# Patient Record
Sex: FEMALE | Race: WHITE | NOT HISPANIC OR LATINO | Employment: UNEMPLOYED | URBAN - METROPOLITAN AREA
[De-identification: names, ages, dates, MRNs, and addresses within clinical notes are randomized per-mention and may not be internally consistent; named-entity substitution may affect disease eponyms.]

---

## 2017-02-15 ENCOUNTER — HOSPITAL ENCOUNTER (OUTPATIENT)
Facility: MEDICAL CENTER | Age: 62
End: 2017-02-17
Attending: EMERGENCY MEDICINE | Admitting: INTERNAL MEDICINE
Payer: MEDICAID

## 2017-02-15 ENCOUNTER — APPOINTMENT (OUTPATIENT)
Dept: RADIOLOGY | Facility: MEDICAL CENTER | Age: 62
End: 2017-02-15
Attending: EMERGENCY MEDICINE
Payer: MEDICAID

## 2017-02-15 DIAGNOSIS — R94.31 T WAVE INVERSION IN EKG: ICD-10-CM

## 2017-02-15 DIAGNOSIS — R42 DIZZINESS: ICD-10-CM

## 2017-02-15 PROCEDURE — 99285 EMERGENCY DEPT VISIT HI MDM: CPT

## 2017-02-15 PROCEDURE — 36415 COLL VENOUS BLD VENIPUNCTURE: CPT

## 2017-02-15 PROCEDURE — 85610 PROTHROMBIN TIME: CPT

## 2017-02-15 PROCEDURE — 85025 COMPLETE CBC W/AUTO DIFF WBC: CPT

## 2017-02-15 PROCEDURE — 80053 COMPREHEN METABOLIC PANEL: CPT

## 2017-02-15 PROCEDURE — 83690 ASSAY OF LIPASE: CPT

## 2017-02-15 PROCEDURE — 85730 THROMBOPLASTIN TIME PARTIAL: CPT

## 2017-02-15 PROCEDURE — 84484 ASSAY OF TROPONIN QUANT: CPT

## 2017-02-15 PROCEDURE — 93005 ELECTROCARDIOGRAM TRACING: CPT | Performed by: EMERGENCY MEDICINE

## 2017-02-15 PROCEDURE — 94760 N-INVAS EAR/PLS OXIMETRY 1: CPT

## 2017-02-15 PROCEDURE — 83880 ASSAY OF NATRIURETIC PEPTIDE: CPT

## 2017-02-15 RX ORDER — HYDRALAZINE HYDROCHLORIDE 20 MG/ML
10 INJECTION INTRAMUSCULAR; INTRAVENOUS ONCE
Status: COMPLETED | OUTPATIENT
Start: 2017-02-16 | End: 2017-02-16

## 2017-02-15 ASSESSMENT — ENCOUNTER SYMPTOMS
DIZZINESS: 1
CHILLS: 0
VOMITING: 0
NAUSEA: 0
SHORTNESS OF BREATH: 0
HEADACHES: 1
DIARRHEA: 0

## 2017-02-15 ASSESSMENT — PAIN SCALES - GENERAL: PAINLEVEL_OUTOF10: 4

## 2017-02-15 ASSESSMENT — LIFESTYLE VARIABLES: DO YOU DRINK ALCOHOL: NO

## 2017-02-15 NOTE — IP AVS SNAPSHOT
" Home Care Instructions                                                                                                                  Name:Lesia Freeman  Medical Record Number:[id number]  CSN: [id number]    YOB: 1955   Age: 61 y.o.  Sex: female  HT:1.499 m (4' 11.02\") WT: 65.6 kg (144 lb 10 oz)          Admit Date: 2/15/2017     Discharge Date:   Today's Date: 2/17/2017  Attending Doctor:  Brady Myers M.D.                  Allergies:  Review of patient's allergies indicates not on file.            Discharge Instructions       Discharge Instructions    Discharged to home by taxi with escort. Discharged via wheelchair, hospital escort: Yes.  Special equipment needed: Not Applicable    Be sure to schedule a follow-up appointment with your primary care doctor or any specialists as instructed.     Discharge Plan:   Diet Plan: Discussed  Activity Level: Discussed  Smoking Cessation Offered: Patient Refused  Confirmed Follow up Appointment: Appointment Scheduled  Confirmed Symptoms Management: Discussed  Medication Reconciliation Updated: Yes  Influenza Vaccine Indication: Not indicated: Previously immunized this influenza season and > 8 years of age    I understand that a diet low in cholesterol, fat, and sodium is recommended for good health. Unless I have been given specific instructions below for another diet, I accept this instruction as my diet prescription.   Other diet: low fat    Special Instructions: None    · Is patient discharged on Warfarin / Coumadin?   No     · Is patient Post Blood Transfusion?  No    Depression / Suicide Risk    As you are discharged from this [facility] facility, it is important to learn how to keep safe from harming yourself.    Recognize the warning signs:  · Abrupt changes in personality, positive or negative- including increase in energy   · Giving away possessions  · Change in eating patterns- significant weight changes-  positive or negative  · Change in sleeping " patterns- unable to sleep or sleeping all the time   · Unwillingness or inability to communicate  · Depression  · Unusual sadness, discouragement and loneliness  · Talk of wanting to die  · Neglect of personal appearance   · Rebelliousness- reckless behavior  · Withdrawal from people/activities they love  · Confusion- inability to concentrate     If you or a loved one observes any of these behaviors or has concerns about self-harm, here's what you can do:  · Talk about it- your feelings and reasons for harming yourself  · Remove any means that you might use to hurt yourself (examples: pills, rope, extension cords, firearm)  · Get professional help from the community (Mental Health, Substance Abuse, psychological counseling)  · Do not be alone:Call your Safe Contact- someone whom you trust who will be there for you.  · Call your local CRISIS HOTLINE 363-0869 or 979-575-2028  · Call your local Children's Mobile Crisis Response Team [place] (755) 132-1523 or www.[employer]  · Call the toll free National Suicide Prevention Hotlines   · National Suicide Prevention Lifeline 029-569-DLTX (8866)  · National Hope Line Network 800-SUICIDE (394-3736)           Discharge Medication Instructions:    Below are the medications your physician expects you to take upon discharge:    Review all your home medications and newly ordered medications with your doctor and/or pharmacist. Follow medication instructions as directed by your doctor and/or pharmacist.    Please keep your medication list with you and share with your physician.               Medication List      START taking these medications        Instructions    amlodipine 10 MG Tabs   Last time this was given:  10 mg on 2/17/2017  8:00 AM   Commonly known as:  NORVASC    Take 1 Tab by mouth every day.   Dose:  10 mg       metformin 500 MG Tabs   Commonly known as:  GLUCOPHAGE    Take 1 Tab by mouth 2 times a day, with meals.   Dose:  500 mg         CONTINUE taking  these medications        Instructions    ATORVASTATIN CALCIUM PO   Last time this was given:  10 mg on 2/16/2017  8:11 PM    Take 80 mg by mouth every day.   Dose:  80 mg       CYTOMEL PO    Take 5 mcg by mouth every day.   Dose:  5 mcg       FIORICET PO    Take  by mouth every 6 hours as needed.       FLEXERIL PO    Take  by mouth.       ibuprofen 800 MG Tabs   Commonly known as:  MOTRIN    Take 800 mg by mouth 3 times a day.   Dose:  800 mg       IMITREX PO   Last time this was given:  50 mg on 2/16/2017  5:45 PM    Take  by mouth.       KLONOPIN PO   Last time this was given:  1 mg on 2/16/2017 12:10 PM    Take 1 mg by mouth 1 time daily as needed (anxiety).   Dose:  1 mg       * LEVOTHYROXINE SODIUM PO   Last time this was given:  100 mcg on 2/17/2017  6:00 AM    Take  by mouth.       * levothyroxine 100 MCG Tabs   Last time this was given:  100 mcg on 2/17/2017  6:00 AM   Commonly known as:  SYNTHROID    Take 100 mcg by mouth Every morning on an empty stomach. Indications: Underactive Thyroid   Dose:  100 mcg       LISINOPRIL PO   Last time this was given:  40 mg on 2/17/2017  6:05 AM    Take 40 mg by mouth every day.   Dose:  40 mg       METOPROLOL TARTRATE PO   Last time this was given:  50 mg on 2/17/2017  8:01 AM    Take 25 mg by mouth every day.   Dose:  25 mg       NORCO PO    Take  by mouth 3 times a day.       pregabalin 75 MG Caps   Last time this was given:  100 mg on 2/17/2017  9:00 AM   Commonly known as:  LYRICA    Take 100 mg by mouth Once. Indications: Generalized Anxiety Disorder   Dose:  100 mg       TRAZODONE HCL PO    Take 100 mg by mouth every day.   Dose:  100 mg       VIIBRYD PO   Last time this was given:  20 mg on 2/17/2017  9:00 AM    Take 20 mg by mouth every day.   Dose:  20 mg       * Notice:  This list has 2 medication(s) that are the same as other medications prescribed for you. Read the directions carefully, and ask your doctor or other care provider to review them with you.             Instructions           Diet / Nutrition:    Follow any diet instructions given to you by your doctor or the dietician, including how much salt (sodium) you are allowed each day.    If you are overweight, talk to your doctor about a weight reduction plan.    Activity:    Remain physically active following your doctor's instructions about exercise and activity.    Rest often.     Any time you become even a little tired or short of breath, SIT DOWN and rest.    Worsening Symptoms:    Report any of the following signs and symptoms to the doctor's office immediately:    *Pain of jaw, arm, or neck  *Chest pain not relieved by medication                               *Dizziness or loss of consciousness  *Difficulty breathing even when at rest   *More tired than usual                                       *Bleeding drainage or swelling of surgical site  *Swelling of feet, ankles, legs or stomach                 *Fever (>100ºF)  *Pink or blood tinged sputum  *Weight gain (3lbs/day or 5lbs /week)           *Shock from internal defibrillator (if applicable)  *Palpitations or irregular heartbeats                *Cool and/or numb extremities    Stroke Awareness    Common Risk Factors for Stroke include:    Age  Atrial Fibrillation  Carotid Artery Stenosis  Diabetes Mellitus  Excessive alcohol consumption  High blood pressure  Overweight   Physical inactivity  Smoking    Warning signs and symptoms of a stroke include:    *Sudden numbness or weakness of the face, arm or leg (especially on one side of the body).  *Sudden confusion, trouble speaking or understanding.  *Sudden trouble seeing in one or both eyes.  *Sudden trouble walking, dizziness, loss of balance or coordination.Sudden severe headache with no known cause.    It is very important to get treatment quickly when a stroke occurs. If you experience any of the above warning signs, call 911 immediately.                   Disclaimer         Quit Smoking / Tobacco  Use:    I understand the use of any tobacco products increases my chance of suffering from future heart disease or stroke and could cause other illnesses which may shorten my life. Quitting the use of tobacco products is the single most important thing I can do to improve my health. For further information on smoking / tobacco cessation call a Toll Free Quit Line at 1-790.520.4226 (*National Cancer [place]) or 1-846.154.3574 (American Lung Association) or you can access the web based program at www.lungusa.org.    [place] Tobacco Users Help Line:  (745) 788-2723       Toll Free: 1-712.880.9708  Quit Tobacco Program [facility] ([phone]    Crisis Hotline:    [place] Crisis Hotline:  5-137-MAAURLU or 1-787.814.4961    [place] Crisis Hotline:    1-304.680.2796 or 335-659-6491    Discharge Survey:   Thank you for choosing [facility]. We hope we did everything we could to make your hospital stay a pleasant one. You may be receiving a phone survey and we would appreciate your time and participation in answering the questions. Your input is very valuable to us in our efforts to improve our service to our patients and their families.        My signature on this form indicates that:    1. I have reviewed and understand the above information.  2. My questions regarding this information have been answered to my satisfaction.  3. I have formulated a plan with my discharge nurse to obtain my prescribed medications for home.                  Disclaimer         __________________________________                     __________       ________                       Patient Signature                                                 Date                    Time

## 2017-02-16 ENCOUNTER — RESOLUTE PROFESSIONAL BILLING HOSPITAL PROF FEE (OUTPATIENT)
Dept: HOSPITALIST | Facility: MEDICAL CENTER | Age: 62
End: 2017-02-16
Payer: MEDICAID

## 2017-02-16 PROBLEM — R42 DIZZINESS: Status: ACTIVE | Noted: 2017-02-16

## 2017-02-16 PROBLEM — R51.9 INTRACTABLE HEADACHE: Status: ACTIVE | Noted: 2017-02-16

## 2017-02-16 PROBLEM — I16.0 HYPERTENSIVE URGENCY: Status: ACTIVE | Noted: 2017-02-16

## 2017-02-16 LAB
ALBUMIN SERPL BCP-MCNC: 3.8 G/DL (ref 3.2–4.9)
ALBUMIN/GLOB SERPL: 1.3 G/DL
ALP SERPL-CCNC: 128 U/L (ref 30–99)
ALT SERPL-CCNC: 118 U/L (ref 2–50)
ANION GAP SERPL CALC-SCNC: 7 MMOL/L (ref 0–11.9)
APPEARANCE UR: CLEAR
APTT PPP: 25.5 SEC (ref 24.7–36)
AST SERPL-CCNC: 32 U/L (ref 12–45)
BACTERIA #/AREA URNS HPF: ABNORMAL /HPF
BASOPHILS # BLD AUTO: 0.5 % (ref 0–1.8)
BASOPHILS # BLD: 0.05 K/UL (ref 0–0.12)
BILIRUB SERPL-MCNC: 0.3 MG/DL (ref 0.1–1.5)
BILIRUB UR QL STRIP.AUTO: NEGATIVE
BNP SERPL-MCNC: 45 PG/ML (ref 0–100)
BUN SERPL-MCNC: 9 MG/DL (ref 8–22)
CALCIUM SERPL-MCNC: 9.5 MG/DL (ref 8.5–10.5)
CHLORIDE SERPL-SCNC: 105 MMOL/L (ref 96–112)
CHOLEST SERPL-MCNC: 93 MG/DL (ref 100–199)
CO2 SERPL-SCNC: 28 MMOL/L (ref 20–33)
COLOR UR: ABNORMAL
CREAT SERPL-MCNC: 0.67 MG/DL (ref 0.5–1.4)
CULTURE IF INDICATED INDCX: YES UA CULTURE
EKG IMPRESSION: NORMAL
EOSINOPHIL # BLD AUTO: 0.27 K/UL (ref 0–0.51)
EOSINOPHIL NFR BLD: 2.7 % (ref 0–6.9)
EPI CELLS #/AREA URNS HPF: ABNORMAL /HPF
ERYTHROCYTE [DISTWIDTH] IN BLOOD BY AUTOMATED COUNT: 42 FL (ref 35.9–50)
EST. AVERAGE GLUCOSE BLD GHB EST-MCNC: 174 MG/DL
GFR SERPL CREATININE-BSD FRML MDRD: >60 ML/MIN/1.73 M 2
GLOBULIN SER CALC-MCNC: 2.9 G/DL (ref 1.9–3.5)
GLUCOSE BLD-MCNC: 145 MG/DL (ref 65–99)
GLUCOSE BLD-MCNC: 194 MG/DL (ref 65–99)
GLUCOSE BLD-MCNC: 197 MG/DL (ref 65–99)
GLUCOSE BLD-MCNC: 245 MG/DL (ref 65–99)
GLUCOSE SERPL-MCNC: 280 MG/DL (ref 65–99)
GLUCOSE UR STRIP.AUTO-MCNC: 500 MG/DL
HBA1C MFR BLD: 7.7 % (ref 0–5.6)
HCT VFR BLD AUTO: 43.5 % (ref 37–47)
HDLC SERPL-MCNC: 36 MG/DL
HGB BLD-MCNC: 14.7 G/DL (ref 12–16)
HYALINE CASTS #/AREA URNS LPF: ABNORMAL /LPF
IMM GRANULOCYTES # BLD AUTO: 0.04 K/UL (ref 0–0.11)
IMM GRANULOCYTES NFR BLD AUTO: 0.4 % (ref 0–0.9)
INR PPP: 1.02 (ref 0.87–1.13)
KETONES UR STRIP.AUTO-MCNC: NEGATIVE MG/DL
LDLC SERPL CALC-MCNC: 39 MG/DL
LEUKOCYTE ESTERASE UR QL STRIP.AUTO: ABNORMAL
LIPASE SERPL-CCNC: 30 U/L (ref 11–82)
LYMPHOCYTES # BLD AUTO: 4.28 K/UL (ref 1–4.8)
LYMPHOCYTES NFR BLD: 42.2 % (ref 22–41)
MCH RBC QN AUTO: 31.1 PG (ref 27–33)
MCHC RBC AUTO-ENTMCNC: 33.8 G/DL (ref 33.6–35)
MCV RBC AUTO: 92 FL (ref 81.4–97.8)
MICRO URNS: ABNORMAL
MONOCYTES # BLD AUTO: 0.56 K/UL (ref 0–0.85)
MONOCYTES NFR BLD AUTO: 5.5 % (ref 0–13.4)
NEUTROPHILS # BLD AUTO: 4.95 K/UL (ref 2–7.15)
NEUTROPHILS NFR BLD: 48.7 % (ref 44–72)
NITRITE UR QL STRIP.AUTO: NEGATIVE
NRBC # BLD AUTO: 0 K/UL
NRBC BLD AUTO-RTO: 0 /100 WBC
PH UR STRIP.AUTO: 7 [PH]
PLATELET # BLD AUTO: 220 K/UL (ref 164–446)
PMV BLD AUTO: 11.2 FL (ref 9–12.9)
POTASSIUM SERPL-SCNC: 3.5 MMOL/L (ref 3.6–5.5)
PROT SERPL-MCNC: 6.7 G/DL (ref 6–8.2)
PROT UR QL STRIP: NEGATIVE MG/DL
PROTHROMBIN TIME: 13.7 SEC (ref 12–14.6)
RBC # BLD AUTO: 4.73 M/UL (ref 4.2–5.4)
RBC # URNS HPF: ABNORMAL /HPF
RBC UR QL AUTO: NEGATIVE
SODIUM SERPL-SCNC: 140 MMOL/L (ref 135–145)
SP GR UR STRIP.AUTO: 1.01
TRIGL SERPL-MCNC: 91 MG/DL (ref 0–149)
TROPONIN I SERPL-MCNC: <0.01 NG/ML (ref 0–0.04)
TSH SERPL DL<=0.005 MIU/L-ACNC: 4.73 UIU/ML (ref 0.3–3.7)
WBC # BLD AUTO: 10.2 K/UL (ref 4.8–10.8)
WBC #/AREA URNS HPF: ABNORMAL /HPF

## 2017-02-16 PROCEDURE — 87086 URINE CULTURE/COLONY COUNT: CPT

## 2017-02-16 PROCEDURE — 96375 TX/PRO/DX INJ NEW DRUG ADDON: CPT

## 2017-02-16 PROCEDURE — A9270 NON-COVERED ITEM OR SERVICE: HCPCS | Performed by: INTERNAL MEDICINE

## 2017-02-16 PROCEDURE — 700102 HCHG RX REV CODE 250 W/ 637 OVERRIDE(OP): Performed by: NURSE PRACTITIONER

## 2017-02-16 PROCEDURE — 700102 HCHG RX REV CODE 250 W/ 637 OVERRIDE(OP): Performed by: INTERNAL MEDICINE

## 2017-02-16 PROCEDURE — 36415 COLL VENOUS BLD VENIPUNCTURE: CPT

## 2017-02-16 PROCEDURE — 83036 HEMOGLOBIN GLYCOSYLATED A1C: CPT

## 2017-02-16 PROCEDURE — 99407 BEHAV CHNG SMOKING > 10 MIN: CPT | Performed by: INTERNAL MEDICINE

## 2017-02-16 PROCEDURE — 700111 HCHG RX REV CODE 636 W/ 250 OVERRIDE (IP): Performed by: EMERGENCY MEDICINE

## 2017-02-16 PROCEDURE — 82962 GLUCOSE BLOOD TEST: CPT | Mod: 91

## 2017-02-16 PROCEDURE — A9270 NON-COVERED ITEM OR SERVICE: HCPCS | Performed by: NURSE PRACTITIONER

## 2017-02-16 PROCEDURE — 96376 TX/PRO/DX INJ SAME DRUG ADON: CPT

## 2017-02-16 PROCEDURE — G0378 HOSPITAL OBSERVATION PER HR: HCPCS

## 2017-02-16 PROCEDURE — 96372 THER/PROPH/DIAG INJ SC/IM: CPT

## 2017-02-16 PROCEDURE — 96374 THER/PROPH/DIAG INJ IV PUSH: CPT

## 2017-02-16 PROCEDURE — 80061 LIPID PANEL: CPT

## 2017-02-16 PROCEDURE — 700101 HCHG RX REV CODE 250: Performed by: INTERNAL MEDICINE

## 2017-02-16 PROCEDURE — 84443 ASSAY THYROID STIM HORMONE: CPT

## 2017-02-16 PROCEDURE — 700111 HCHG RX REV CODE 636 W/ 250 OVERRIDE (IP): Performed by: INTERNAL MEDICINE

## 2017-02-16 PROCEDURE — 71010 DX-CHEST-PORTABLE (1 VIEW): CPT

## 2017-02-16 PROCEDURE — 81001 URINALYSIS AUTO W/SCOPE: CPT

## 2017-02-16 PROCEDURE — 99220 PR INITIAL OBSERVATION CARE,LEVL III: CPT | Mod: 25 | Performed by: INTERNAL MEDICINE

## 2017-02-16 RX ORDER — LACTULOSE 20 G/30ML
30 SOLUTION ORAL
Status: DISCONTINUED | OUTPATIENT
Start: 2017-02-16 | End: 2017-02-17 | Stop reason: HOSPADM

## 2017-02-16 RX ORDER — TIOTROPIUM BROMIDE 18 UG/1
1 CAPSULE ORAL; RESPIRATORY (INHALATION)
Status: DISCONTINUED | OUTPATIENT
Start: 2017-02-16 | End: 2017-02-17 | Stop reason: HOSPADM

## 2017-02-16 RX ORDER — AMOXICILLIN 250 MG
1 CAPSULE ORAL
Status: DISCONTINUED | OUTPATIENT
Start: 2017-02-16 | End: 2017-02-17 | Stop reason: HOSPADM

## 2017-02-16 RX ORDER — NICOTINE 21 MG/24HR
21 PATCH, TRANSDERMAL 24 HOURS TRANSDERMAL
Status: DISCONTINUED | OUTPATIENT
Start: 2017-02-16 | End: 2017-02-17 | Stop reason: HOSPADM

## 2017-02-16 RX ORDER — POTASSIUM CHLORIDE 20 MEQ/1
40 TABLET, EXTENDED RELEASE ORAL ONCE
Status: COMPLETED | OUTPATIENT
Start: 2017-02-16 | End: 2017-02-16

## 2017-02-16 RX ORDER — AMLODIPINE BESYLATE 5 MG/1
5 TABLET ORAL
Status: DISCONTINUED | OUTPATIENT
Start: 2017-02-16 | End: 2017-02-16

## 2017-02-16 RX ORDER — OXYCODONE HYDROCHLORIDE 5 MG/1
5 TABLET ORAL
Status: DISCONTINUED | OUTPATIENT
Start: 2017-02-16 | End: 2017-02-17 | Stop reason: HOSPADM

## 2017-02-16 RX ORDER — PREGABALIN 100 MG/1
100 CAPSULE ORAL DAILY
Status: DISCONTINUED | OUTPATIENT
Start: 2017-02-17 | End: 2017-02-17 | Stop reason: HOSPADM

## 2017-02-16 RX ORDER — BUDESONIDE AND FORMOTEROL FUMARATE DIHYDRATE 160; 4.5 UG/1; UG/1
2 AEROSOL RESPIRATORY (INHALATION)
Status: DISCONTINUED | OUTPATIENT
Start: 2017-02-16 | End: 2017-02-17 | Stop reason: HOSPADM

## 2017-02-16 RX ORDER — DEXTROSE MONOHYDRATE 25 G/50ML
25 INJECTION, SOLUTION INTRAVENOUS
Status: DISCONTINUED | OUTPATIENT
Start: 2017-02-16 | End: 2017-02-17 | Stop reason: HOSPADM

## 2017-02-16 RX ORDER — ATORVASTATIN CALCIUM 20 MG/1
40 TABLET, FILM COATED ORAL
Status: DISCONTINUED | OUTPATIENT
Start: 2017-02-16 | End: 2017-02-16

## 2017-02-16 RX ORDER — PREGABALIN 75 MG/1
100 CAPSULE ORAL ONCE
COMMUNITY
End: 2017-04-30

## 2017-02-16 RX ORDER — AMOXICILLIN 250 MG
1 CAPSULE ORAL NIGHTLY
Status: DISCONTINUED | OUTPATIENT
Start: 2017-02-16 | End: 2017-02-17 | Stop reason: HOSPADM

## 2017-02-16 RX ORDER — DOCUSATE SODIUM 100 MG/1
100 CAPSULE, LIQUID FILLED ORAL 2 TIMES DAILY
Status: DISCONTINUED | OUTPATIENT
Start: 2017-02-16 | End: 2017-02-17 | Stop reason: HOSPADM

## 2017-02-16 RX ORDER — PROMETHAZINE HYDROCHLORIDE 25 MG/1
12.5-25 TABLET ORAL EVERY 4 HOURS PRN
Status: DISCONTINUED | OUTPATIENT
Start: 2017-02-16 | End: 2017-02-17 | Stop reason: HOSPADM

## 2017-02-16 RX ORDER — BUTALBITAL, ACETAMINOPHEN AND CAFFEINE 50; 325; 40 MG/1; MG/1; MG/1
1 TABLET ORAL EVERY 6 HOURS PRN
Status: DISCONTINUED | OUTPATIENT
Start: 2017-02-16 | End: 2017-02-17 | Stop reason: HOSPADM

## 2017-02-16 RX ORDER — CLONAZEPAM 1 MG/1
1 TABLET ORAL
Status: DISCONTINUED | OUTPATIENT
Start: 2017-02-16 | End: 2017-02-16

## 2017-02-16 RX ORDER — ENEMA 19; 7 G/133ML; G/133ML
1 ENEMA RECTAL
Status: DISCONTINUED | OUTPATIENT
Start: 2017-02-16 | End: 2017-02-17 | Stop reason: HOSPADM

## 2017-02-16 RX ORDER — OXYCODONE HYDROCHLORIDE 5 MG/1
2.5 TABLET ORAL
Status: DISCONTINUED | OUTPATIENT
Start: 2017-02-16 | End: 2017-02-17 | Stop reason: HOSPADM

## 2017-02-16 RX ORDER — AMLODIPINE BESYLATE 10 MG/1
10 TABLET ORAL
Status: DISCONTINUED | OUTPATIENT
Start: 2017-02-17 | End: 2017-02-16

## 2017-02-16 RX ORDER — PREGABALIN 100 MG/1
100 CAPSULE ORAL ONCE
Status: COMPLETED | OUTPATIENT
Start: 2017-02-16 | End: 2017-02-16

## 2017-02-16 RX ORDER — ATORVASTATIN CALCIUM 10 MG/1
10 TABLET, FILM COATED ORAL
Status: DISCONTINUED | OUTPATIENT
Start: 2017-02-16 | End: 2017-02-17 | Stop reason: HOSPADM

## 2017-02-16 RX ORDER — BISACODYL 10 MG
10 SUPPOSITORY, RECTAL RECTAL
Status: DISCONTINUED | OUTPATIENT
Start: 2017-02-16 | End: 2017-02-17 | Stop reason: HOSPADM

## 2017-02-16 RX ORDER — IBUPROFEN 800 MG/1
800 TABLET ORAL 3 TIMES DAILY
COMMUNITY
End: 2017-04-30

## 2017-02-16 RX ORDER — CLONAZEPAM 1 MG/1
1 TABLET ORAL
Status: DISCONTINUED | OUTPATIENT
Start: 2017-02-16 | End: 2017-02-17 | Stop reason: HOSPADM

## 2017-02-16 RX ORDER — LABETALOL HYDROCHLORIDE 5 MG/ML
10 INJECTION, SOLUTION INTRAVENOUS EVERY 4 HOURS PRN
Status: DISCONTINUED | OUTPATIENT
Start: 2017-02-16 | End: 2017-02-17 | Stop reason: HOSPADM

## 2017-02-16 RX ORDER — LEVOTHYROXINE SODIUM 0.1 MG/1
100 TABLET ORAL
COMMUNITY
End: 2017-04-30

## 2017-02-16 RX ORDER — LEVOTHYROXINE SODIUM 0.1 MG/1
100 TABLET ORAL
Status: DISCONTINUED | OUTPATIENT
Start: 2017-02-16 | End: 2017-02-17 | Stop reason: HOSPADM

## 2017-02-16 RX ORDER — AMLODIPINE BESYLATE 5 MG/1
5 TABLET ORAL
Status: DISCONTINUED | OUTPATIENT
Start: 2017-02-17 | End: 2017-02-17

## 2017-02-16 RX ORDER — IPRATROPIUM BROMIDE AND ALBUTEROL SULFATE 2.5; .5 MG/3ML; MG/3ML
3 SOLUTION RESPIRATORY (INHALATION)
Status: DISCONTINUED | OUTPATIENT
Start: 2017-02-16 | End: 2017-02-17 | Stop reason: HOSPADM

## 2017-02-16 RX ORDER — ONDANSETRON 2 MG/ML
4 INJECTION INTRAMUSCULAR; INTRAVENOUS EVERY 4 HOURS PRN
Status: DISCONTINUED | OUTPATIENT
Start: 2017-02-16 | End: 2017-02-17 | Stop reason: HOSPADM

## 2017-02-16 RX ORDER — MORPHINE SULFATE 4 MG/ML
2 INJECTION, SOLUTION INTRAMUSCULAR; INTRAVENOUS
Status: DISCONTINUED | OUTPATIENT
Start: 2017-02-16 | End: 2017-02-17 | Stop reason: HOSPADM

## 2017-02-16 RX ORDER — SUMATRIPTAN 50 MG/1
50 TABLET, FILM COATED ORAL EVERY 8 HOURS PRN
Status: DISCONTINUED | OUTPATIENT
Start: 2017-02-16 | End: 2017-02-17 | Stop reason: HOSPADM

## 2017-02-16 RX ORDER — LISINOPRIL 10 MG/1
10 TABLET ORAL
Status: DISCONTINUED | OUTPATIENT
Start: 2017-02-16 | End: 2017-02-16

## 2017-02-16 RX ORDER — ONDANSETRON 4 MG/1
4 TABLET, ORALLY DISINTEGRATING ORAL EVERY 4 HOURS PRN
Status: DISCONTINUED | OUTPATIENT
Start: 2017-02-16 | End: 2017-02-17 | Stop reason: HOSPADM

## 2017-02-16 RX ORDER — VILAZODONE HYDROCHLORIDE 20 MG/1
20 TABLET ORAL DAILY
Status: DISCONTINUED | OUTPATIENT
Start: 2017-02-17 | End: 2017-02-17 | Stop reason: HOSPADM

## 2017-02-16 RX ORDER — LISINOPRIL 20 MG/1
40 TABLET ORAL
Status: DISCONTINUED | OUTPATIENT
Start: 2017-02-16 | End: 2017-02-17 | Stop reason: HOSPADM

## 2017-02-16 RX ORDER — PROMETHAZINE HYDROCHLORIDE 25 MG/1
12.5-25 SUPPOSITORY RECTAL EVERY 4 HOURS PRN
Status: DISCONTINUED | OUTPATIENT
Start: 2017-02-16 | End: 2017-02-17 | Stop reason: HOSPADM

## 2017-02-16 RX ORDER — ENALAPRILAT 1.25 MG/ML
1.25 INJECTION INTRAVENOUS EVERY 6 HOURS PRN
Status: DISCONTINUED | OUTPATIENT
Start: 2017-02-16 | End: 2017-02-17 | Stop reason: HOSPADM

## 2017-02-16 RX ORDER — ACETAMINOPHEN 325 MG/1
650 TABLET ORAL EVERY 6 HOURS PRN
Status: DISCONTINUED | OUTPATIENT
Start: 2017-02-16 | End: 2017-02-17 | Stop reason: HOSPADM

## 2017-02-16 RX ADMIN — LABETALOL HYDROCHLORIDE 10 MG: 5 INJECTION, SOLUTION INTRAVENOUS at 23:07

## 2017-02-16 RX ADMIN — SUMATRIPTAN SUCCINATE 50 MG: 50 TABLET ORAL at 17:45

## 2017-02-16 RX ADMIN — OXYCODONE HYDROCHLORIDE 5 MG: 5 TABLET ORAL at 03:35

## 2017-02-16 RX ADMIN — POTASSIUM CHLORIDE 40 MEQ: 1500 TABLET, EXTENDED RELEASE ORAL at 03:35

## 2017-02-16 RX ADMIN — ACETAMINOPHEN 650 MG: 325 TABLET, FILM COATED ORAL at 03:35

## 2017-02-16 RX ADMIN — INSULIN LISPRO 2 UNITS: 100 INJECTION, SOLUTION INTRAVENOUS; SUBCUTANEOUS at 20:16

## 2017-02-16 RX ADMIN — HYDRALAZINE HYDROCHLORIDE 10 MG: 20 INJECTION INTRAMUSCULAR; INTRAVENOUS at 00:33

## 2017-02-16 RX ADMIN — PREGABALIN 100 MG: 100 CAPSULE ORAL at 08:29

## 2017-02-16 RX ADMIN — ENALAPRILAT 1.25 MG: 1.25 INJECTION INTRAVENOUS at 05:42

## 2017-02-16 RX ADMIN — CLONAZEPAM 1 MG: 1 TABLET ORAL at 12:10

## 2017-02-16 RX ADMIN — NICOTINE POLACRILEX 2 MG: 2 GUM, CHEWING BUCCAL at 17:44

## 2017-02-16 RX ADMIN — METOPROLOL TARTRATE 50 MG: 25 TABLET, FILM COATED ORAL at 07:58

## 2017-02-16 RX ADMIN — INSULIN LISPRO 2 UNITS: 100 INJECTION, SOLUTION INTRAVENOUS; SUBCUTANEOUS at 17:48

## 2017-02-16 RX ADMIN — INSULIN LISPRO 3 UNITS: 100 INJECTION, SOLUTION INTRAVENOUS; SUBCUTANEOUS at 11:14

## 2017-02-16 RX ADMIN — OXYCODONE HYDROCHLORIDE 5 MG: 5 TABLET ORAL at 07:58

## 2017-02-16 RX ADMIN — METOPROLOL TARTRATE 50 MG: 25 TABLET, FILM COATED ORAL at 20:11

## 2017-02-16 RX ADMIN — OXYCODONE HYDROCHLORIDE 5 MG: 5 TABLET ORAL at 12:10

## 2017-02-16 RX ADMIN — OXYCODONE HYDROCHLORIDE 5 MG: 5 TABLET ORAL at 20:19

## 2017-02-16 RX ADMIN — ENALAPRILAT 1.25 MG: 1.25 INJECTION INTRAVENOUS at 21:35

## 2017-02-16 RX ADMIN — ATORVASTATIN CALCIUM 10 MG: 10 TABLET, FILM COATED ORAL at 20:11

## 2017-02-16 RX ADMIN — METOPROLOL TARTRATE 50 MG: 25 TABLET, FILM COATED ORAL at 03:35

## 2017-02-16 RX ADMIN — LISINOPRIL 40 MG: 20 TABLET ORAL at 08:29

## 2017-02-16 RX ADMIN — LEVOTHYROXINE SODIUM 100 MCG: 100 TABLET ORAL at 10:06

## 2017-02-16 RX ADMIN — AMLODIPINE BESYLATE 5 MG: 5 TABLET ORAL at 12:10

## 2017-02-16 ASSESSMENT — PAIN SCALES - GENERAL
PAINLEVEL_OUTOF10: 0
PAINLEVEL_OUTOF10: 7
PAINLEVEL_OUTOF10: 6
PAINLEVEL_OUTOF10: 6
PAINLEVEL_OUTOF10: 2
PAINLEVEL_OUTOF10: 7

## 2017-02-16 ASSESSMENT — LIFESTYLE VARIABLES: EVER_SMOKED: YES

## 2017-02-16 ASSESSMENT — COPD QUESTIONNAIRES
HAVE YOU SMOKED AT LEAST 100 CIGARETTES IN YOUR ENTIRE LIFE: YES
DO YOU EVER COUGH UP ANY MUCUS OR PHLEGM?: NO/ONLY WITH OCCASIONAL COLDS OR INFECTIONS
COPD SCREENING SCORE: 4
DURING THE PAST 4 WEEKS HOW MUCH DID YOU FEEL SHORT OF BREATH: SOME OF THE TIME

## 2017-02-16 NOTE — PROGRESS NOTES
"Pt states,\" Im having a really hard day, I feel like I'm falling apart.\" Pt medicate for anxiety. Assisted pt in contacted PCP to reschedule appointment tomorrow.   "

## 2017-02-16 NOTE — PROGRESS NOTES
Bedside report received 0700. POC discussed with pt; Pt concerned about missing home medication, reviewed med rec, will discuss with MD; Denies HA, c/o minimal dizziness with movement; all questions answered at this time.

## 2017-02-16 NOTE — PROGRESS NOTES
Home medication added per MAR. Hospital does not carry Viibryd, anti depressant. Pt informed. Will see if family can bring in home medication. Or will go without.

## 2017-02-16 NOTE — ED PROVIDER NOTES
"ED Provider Note    Scribed for Sukhdev Carty D.O. by Riddhi Huang. 2/15/2017  11:45 PM    Means of arrival: ambulance  History obtained from: patient  History limited by: none    CHIEF COMPLAINT  Chief Complaint   Patient presents with   • Hypertension     hx of same; non-compliant over last 2 weeks with lisinopril; 217/117 on arrival    • Head Ache     since yesterday morning, worsened today   • Dizziness     around 6 pm when ambulatory; like \"the lights were going out\", denies LOC       HPI  Lesia Freeman is a 61 y.o. female with a history of hypertension who presents to the Emergency Department for excessive tiredness onset several weeks ago with associated forgetfulness, dizziness and headache. Patient is non compliant with her blood pressure medications. Per son, the patient was recently taken off of her high blood pressure medication because her blood pressure was consistently low, however, she was recently placed back on the medication.  Mild chest heaviness, no fevers. Negative nausea, vomiting, diarrhea, chills, chest pain, or shortness of breath.    REVIEW OF SYSTEMS  Review of Systems   Constitutional: Positive for malaise/fatigue. Negative for chills.   Respiratory: Negative for shortness of breath.    Cardiovascular: Negative for chest pain.   Gastrointestinal: Negative for nausea, vomiting and diarrhea.   Neurological: Positive for dizziness and headaches.   All other systems reviewed and are negative.      PAST MEDICAL HISTORY   has a past medical history of Psychiatric disorder; Hypertension; and Hypothyroidism.    SURGICAL HISTORY   has past surgical history that includes hysterectomy, total abdominal; cholecystectomy; tonsillectomy; and gyn surgery.    SOCIAL HISTORY  Social History   Substance Use Topics   • Smoking status: Current Every Day Smoker -- 0.50 packs/day     Types: Cigarettes   • Smokeless tobacco: Never Used   • Alcohol Use: No      History   Drug Use No       FAMILY " "HISTORY  History reviewed. No pertinent family history.    CURRENT MEDICATIONS  Home Medications     **Home medications have not yet been reviewed for this encounter**          ALLERGIES  Not on File    PHYSICAL EXAM  VITAL SIGNS: /117 mmHg  Pulse 68  Temp(Src) 36 °C (96.8 °F)  Resp 16  Ht 1.499 m (4' 11.02\")  Wt 62.143 kg (137 lb)  BMI 27.66 kg/m2  SpO2 94%    Constitutional: Well developed, well nourished. No acute distress.  HEENT: Normocephalic, atraumatic. Posterior pharynx clear and moist.  Eyes:  EOMI. Normal sclera.  Neck: Supple, Full range of motion, nontender.  Chest/Pulmonary: clear to ausculation. Symmetrical expansion.   Cardio: Regular rate and rhythm with no murmur.   Abdomen: Soft, nontender. No peritoneal signs. No guarding. No palpable masses.  Back: No CVA tenderness, nontender midline, no step offs.  Musculoskeletal: No deformity, no edema, neurovascular intact.   Neuro: Clear speech, appropriate, cooperative, cranial nerves II-XII grossly intact.  Psych: Normal mood and affect    DIAGNOSTIC STUDIES / PROCEDURES    LABS  Results for orders placed or performed during the hospital encounter of 02/15/17   CBC WITH DIFFERENTIAL   Result Value Ref Range    WBC 10.2 4.8 - 10.8 K/uL    RBC 4.73 4.20 - 5.40 M/uL    Hemoglobin 14.7 12.0 - 16.0 g/dL    Hematocrit 43.5 37.0 - 47.0 %    MCV 92.0 81.4 - 97.8 fL    MCH 31.1 27.0 - 33.0 pg    MCHC 33.8 33.6 - 35.0 g/dL    RDW 42.0 35.9 - 50.0 fL    Platelet Count 220 164 - 446 K/uL    MPV 11.2 9.0 - 12.9 fL    Neutrophils-Polys 48.70 44.00 - 72.00 %    Lymphocytes 42.20 (H) 22.00 - 41.00 %    Monocytes 5.50 0.00 - 13.40 %    Eosinophils 2.70 0.00 - 6.90 %    Basophils 0.50 0.00 - 1.80 %    Immature Granulocytes 0.40 0.00 - 0.90 %    Nucleated RBC 0.00 /100 WBC    Neutrophils (Absolute) 4.95 2.00 - 7.15 K/uL    Lymphs (Absolute) 4.28 1.00 - 4.80 K/uL    Monos (Absolute) 0.56 0.00 - 0.85 K/uL    Eos (Absolute) 0.27 0.00 - 0.51 K/uL    Baso " (Absolute) 0.05 0.00 - 0.12 K/uL    Immature Granulocytes (abs) 0.04 0.00 - 0.11 K/uL    NRBC (Absolute) 0.00 K/uL   COMP METABOLIC PANEL   Result Value Ref Range    Sodium 140 135 - 145 mmol/L    Potassium 3.5 (L) 3.6 - 5.5 mmol/L    Chloride 105 96 - 112 mmol/L    Co2 28 20 - 33 mmol/L    Anion Gap 7.0 0.0 - 11.9    Glucose 280 (H) 65 - 99 mg/dL    Bun 9 8 - 22 mg/dL    Creatinine 0.67 0.50 - 1.40 mg/dL    Calcium 9.5 8.5 - 10.5 mg/dL    AST(SGOT) 32 12 - 45 U/L    ALT(SGPT) 118 (H) 2 - 50 U/L    Alkaline Phosphatase 128 (H) 30 - 99 U/L    Total Bilirubin 0.3 0.1 - 1.5 mg/dL    Albumin 3.8 3.2 - 4.9 g/dL    Total Protein 6.7 6.0 - 8.2 g/dL    Globulin 2.9 1.9 - 3.5 g/dL    A-G Ratio 1.3 g/dL   LIPASE   Result Value Ref Range    Lipase 30 11 - 82 U/L   PROTHROMBIN TIME   Result Value Ref Range    PT 13.7 12.0 - 14.6 sec    INR 1.02 0.87 - 1.13   APTT   Result Value Ref Range    APTT 25.5 24.7 - 36.0 sec   TROPONIN   Result Value Ref Range    Troponin I <0.01 0.00 - 0.04 ng/mL   BTYPE NATRIURETIC PEPTIDE   Result Value Ref Range    B Natriuretic Peptide 45 0 - 100 pg/mL   ESTIMATED GFR   Result Value Ref Range    GFR If African American >60 >60 mL/min/1.73 m 2    GFR If Non African American >60 >60 mL/min/1.73 m 2   URINALYSIS,CULTURE IF INDICATED   Result Value Ref Range    Micro Urine Req Microscopic    EKG (ER)   Result Value Ref Range    Report       [facility] Emergency Dept.    Test Date:  2017-02-15  Pt Name:    SHERLY MORALES                Department: ER  MRN:        [id number]                      Room:       [facility]  Gender:     F                            Technician: [id number]  :        [id number]                   Requested By:JÚNIOR FREEMAN  Order #:    [id number]                    Reading MD:    Measurements  Intervals                                Axis  Rate:       68                           P:          34  CO:         152                          QRS:        44  QRSD:       80                            T:          207  QT:         416  QTc:        443    Interpretive Statements  SINUS RHYTHM  ABNORMAL T, CONSIDER ISCHEMIA, ANT-LAT LEADS  No previous ECG available for comparison         All labs reviewed by me.    EKG  Normal sinus rhythm at a rate of 68. No ST elevations ST depressions in V4 and 5. T wave inversion in anterior leads. Normal intervals. No ectopy noted. No old for comparison. As interpreted by me.     RADIOLOGY  DX-CHEST-PORTABLE (1 VIEW)   Final Result         1. No acute cardiopulmonary abnormalities are identified.        The radiologist's interpretation of all radiological studies have been reviewed by me.    COURSE & MEDICAL DECISION MAKING  Pertinent Labs & Imaging studies reviewed. (See chart for details)    11:45 PM - Patient seen and examined at bedside. Ordered chest xray, CBC, CMP, ;lipase, prothrombin time, APTT, troponin, BTYPE natriuretic peptide, and EKG to evaluate her symptoms. The differential diagnoses include but are not limited to: UTI, electrolyte abnormality, TIA, MI    12:57 AM Spoke with Dr. Hartley, Hospitalist, about the patient's condition. Agrees to admit.      DISPOSITION:  Patient will be admitted to Dr. Hartley, Hospitalist in guarded condition. The pt has no current pain, and will stay for a cardiac rule out.      FINAL IMPRESSION  1. Dizziness    2. T wave inversion in EKG          Riddhi GRACE (Scribe), am scribing for, and in the presence of, Sukhdev Carty D.O..    Electronically signed by: Riddhi Huang (Scribe), 2/15/2017    Sukhdev GRACE D.O. personally performed the services described in this documentation, as scribed by Riddhi Huang in my presence, and it is both accurate and complete.    The note accurately reflects work and decisions made by me.  Sukhdev Carty  2/16/2017  12:59 AM
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
## 2017-02-16 NOTE — PROGRESS NOTES
This is a 60 y/o female with a h/o HTN, hypothyroidism, hyperlipidemia, migraine, anxiety, and methamphetamine use.  She is admitted with hypertensive urgency.The patient's BP is improving.  Headache still lingering, but improved.  She reports being depressed lately and very tired more often.  Psychiatry was consulted.  Hgb A1c was found to be 7.7.  Plan as follows:  -Start norvasc.  Monitor BP overnight.  -Psychiatry to follow for antidepressant recs.  -Diabetes educator to see patient.  -D/C in the am if BP stable.

## 2017-02-16 NOTE — ED NOTES
Pt assisted onto bedside commode. Unable to ambulate to restroom as she became dizzy upon sitting up on gurney. Urine collected and sent to lab.
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
## 2017-02-16 NOTE — H&P
"PRIMARY CARE PHYSICIAN:  Dr. Alberto Blackwood.    CHIEF COMPLAINT:  Dizziness and headache.    HISTORY OF PRESENT ILLNESS:  This is a 61-year-old female with a past medical   history significant for tobacco abuse, migraine, hypertension, hypothyroidism   who presents today for evaluation of dizziness and headache that began this   morning.  The patient states that she woke up this morning with a headache   associated with mild photophobia.  She took her Imitrex, but it did not   resolve her symptoms, so she had to come to the ER for further evaluation.    Patient also has dizziness associated with it.  She denies any unsteady gait,   but feels that her vision overtime is getting worse.  She wears prescription   glasses at home, but they are not helping.  She denies loss of consciousness,   hitting her head, numbness, tingling.  She states that presently she is having   some mild dizziness.  She denies any fevers, but complains of chills.  She   has been having dry heaves, but denies any emesis.  She denies any urinary,   bowel symptoms, any abdominal pain, chest pain, shortness of breath.  She is   suppose to be on hypertensive medication, but is noncompliant with her   medications.  She states that \"I am supposed to take 1 medicine in the morning   and 1 at night, but I usually do not take the night one if I fall sleep.\"    She states that she has too many medications to take and forgets to take some   of her medications at times.      REVIEW OF SYSTEMS:  A comprehensive review of system was conducted and all   pertinent positive and negative are documented in the HPI.    PAST MEDICAL HISTORY:  Significant for hypertension, hypothyroidism,   hyperlipidemia, migraine, and anxiety, history of methamphetamine use.    ALLERGIES:  Patient has no known drug allergies.    FAMILY HISTORY:  Reviewed and noncontributory.    HOME MEDICATIONS:  Patient is on several medications, but her dosages of   medications are unknown.  " She is on atorvastatin, furosemide, Klonopin,   Flexeril, Norco, levothyroxine, Cytomel, lisinopril, metoprolol tartrate,   Imitrex, trazodone and Viibryd.    PHYSICAL EXAMINATION:  VITAL SIGNS:  Temperature of 36, heart rate of 68, respirations 16, blood   pressure 209/103, O2 saturation 97% on room air.  GENERAL:  This is a pleasant, cooperative 61-year-old female in no acute   distress.  The patient is disheveled and smells of tobacco.  HEENT:  Normocephalic, atraumatic.  Pupils equal and reactive to light.    Extraocular motions intact.  Moist oral mucosa, no erythema noted.  NECK:  Supple, normal range of motion, no JVD noted.  CARDIOVASCULAR:  Regular rate and rhythm.  No murmurs, rubs or gallops noted.  LUNGS:  Clear to auscultation bilaterally.  No rhonchi, wheezes, or rales   heard.  ABDOMEN:  Soft, nontender, and nondistended.  Bowel sounds present.  EXTREMITIES:  No clubbing, cyanosis or edema noted.  NEUROLOGIC:  Alert, awake and oriented x3.  No focal deficits noted.  PSYCHIATRIC:  Normal mood, normal affect, normal judgment.    LABORATORY DATA:  WBC of 10.2, hemoglobin of 14.7, hematocrit of 43.5, lipase   of 30, glucose of 280, potassium of 3.5.  ALT of 118, alkaline phosphatase of   128.  UA shows moderate leukocyte esterase, negative nitrite, WBC of 10-20.    Troponin less than 0.01.  BNP of 45.    IMAGING:  Chest x-ray shows no acute cardiopulmonary process.  EKG per my   read, sinus rhythm, rate of 62, QTC of 443.  Inverted T-waves noted in leads   V1-V6.    ASSESSMENT AND PLAN:  1.  For patient's hypertensive urgency, likely secondary to medication   noncompliance.  Patient's home medication dosages are unknown.  Her son states   that he will bring them into the hospital tomorrow.  She is started on   lisinopril 10 mg, metoprolol 50 mg twice a day.  She was also started on   p.r.n. antihypertensive medications, on Vasotec and Labetalol for hypertensive   episodes.  2.  Hyperlipidemia.  The  patient's home dose of Lipitor is unknown.  Lipid   panel was ordered.  Patient was started on Lipitor 10 mg daily, we will adjust   dose based on home regimen.  Lipid panel is ordered.  3.  Hyperglycemia.  The patient does not have a history of diabetes.  A1c is   ordered for further evaluation.  Patient is started on insulin sliding scale,   Accu-Chek and hypoglycemic protocol are ordered.  4.  Ongoing tobacco abuse.  Smoking cessation counseling was provided for 11   minutes.  The patient is to be discussed outpatient resources as well as   medications such as Wellbutrin, Chantix.  Patient is started on nicotine   replacement therapy.  5.  Undiagnosed chronic obstructive pulmonary disease, patient does not seem   to be in any acute exacerbation at present time, she is started on DuoNeb,   Spiriva and Symbicort.  RT protocol is ordered.  6.  Dizziness, likely secondary to uncontrolled hypertension.  7.  Migraine.  The patient is continued on Imitrex and Fioricet p.r.n. for   migraines, likely secondary to uncontrolled hypertension.    DISPOSITION:  Observation.    PROPHYLAXIS:  Patient is on Lovenox subQ for DVT prophylaxis.  No GI   prophylaxis indicated.    CODE STATUS:  Full.    Plan of care discussed with patient and family at bedside and all questions   were answered.       ____________________________________     CRISSY BOURGEOIS MD MS / NTS    DD:  02/16/2017 01:29:46  DT:  02/16/2017 02:00:19    D#:  [id number]  Job#:  [id number]

## 2017-02-17 VITALS
TEMPERATURE: 97.9 F | OXYGEN SATURATION: 96 % | WEIGHT: 144.62 LBS | DIASTOLIC BLOOD PRESSURE: 99 MMHG | SYSTOLIC BLOOD PRESSURE: 197 MMHG | BODY MASS INDEX: 29.16 KG/M2 | HEART RATE: 65 BPM | RESPIRATION RATE: 20 BRPM | HEIGHT: 59 IN

## 2017-02-17 PROBLEM — R42 DIZZINESS: Status: RESOLVED | Noted: 2017-02-16 | Resolved: 2017-02-17

## 2017-02-17 PROBLEM — I16.0 HYPERTENSIVE URGENCY: Status: RESOLVED | Noted: 2017-02-16 | Resolved: 2017-02-17

## 2017-02-17 PROBLEM — R51.9 INTRACTABLE HEADACHE: Status: RESOLVED | Noted: 2017-02-16 | Resolved: 2017-02-17

## 2017-02-17 LAB
ANION GAP SERPL CALC-SCNC: 6 MMOL/L (ref 0–11.9)
BUN SERPL-MCNC: 12 MG/DL (ref 8–22)
CALCIUM SERPL-MCNC: 9.7 MG/DL (ref 8.5–10.5)
CHLORIDE SERPL-SCNC: 103 MMOL/L (ref 96–112)
CO2 SERPL-SCNC: 28 MMOL/L (ref 20–33)
CREAT SERPL-MCNC: 0.81 MG/DL (ref 0.5–1.4)
ERYTHROCYTE [DISTWIDTH] IN BLOOD BY AUTOMATED COUNT: 41.9 FL (ref 35.9–50)
GFR SERPL CREATININE-BSD FRML MDRD: >60 ML/MIN/1.73 M 2
GLUCOSE BLD-MCNC: 211 MG/DL (ref 65–99)
GLUCOSE SERPL-MCNC: 279 MG/DL (ref 65–99)
HCT VFR BLD AUTO: 45.3 % (ref 37–47)
HGB BLD-MCNC: 14.9 G/DL (ref 12–16)
MCH RBC QN AUTO: 30.2 PG (ref 27–33)
MCHC RBC AUTO-ENTMCNC: 32.9 G/DL (ref 33.6–35)
MCV RBC AUTO: 91.9 FL (ref 81.4–97.8)
PLATELET # BLD AUTO: 209 K/UL (ref 164–446)
PMV BLD AUTO: 11.1 FL (ref 9–12.9)
POTASSIUM SERPL-SCNC: 4.1 MMOL/L (ref 3.6–5.5)
RBC # BLD AUTO: 4.93 M/UL (ref 4.2–5.4)
SODIUM SERPL-SCNC: 137 MMOL/L (ref 135–145)
WBC # BLD AUTO: 12.1 K/UL (ref 4.8–10.8)

## 2017-02-17 PROCEDURE — 85027 COMPLETE CBC AUTOMATED: CPT

## 2017-02-17 PROCEDURE — 36415 COLL VENOUS BLD VENIPUNCTURE: CPT

## 2017-02-17 PROCEDURE — 82962 GLUCOSE BLOOD TEST: CPT

## 2017-02-17 PROCEDURE — 700102 HCHG RX REV CODE 250 W/ 637 OVERRIDE(OP): Performed by: INTERNAL MEDICINE

## 2017-02-17 PROCEDURE — A9270 NON-COVERED ITEM OR SERVICE: HCPCS | Performed by: NURSE PRACTITIONER

## 2017-02-17 PROCEDURE — A9270 NON-COVERED ITEM OR SERVICE: HCPCS | Performed by: INTERNAL MEDICINE

## 2017-02-17 PROCEDURE — 700102 HCHG RX REV CODE 250 W/ 637 OVERRIDE(OP): Performed by: NURSE PRACTITIONER

## 2017-02-17 PROCEDURE — 96372 THER/PROPH/DIAG INJ SC/IM: CPT

## 2017-02-17 PROCEDURE — G0378 HOSPITAL OBSERVATION PER HR: HCPCS

## 2017-02-17 PROCEDURE — 80048 BASIC METABOLIC PNL TOTAL CA: CPT

## 2017-02-17 PROCEDURE — 99217 PR OBSERVATION CARE DISCHARGE: CPT | Performed by: INTERNAL MEDICINE

## 2017-02-17 RX ORDER — AMLODIPINE BESYLATE 10 MG/1
10 TABLET ORAL DAILY
Qty: 30 TAB | Refills: 3 | Status: SHIPPED | OUTPATIENT
Start: 2017-02-17 | End: 2017-04-30

## 2017-02-17 RX ORDER — AMLODIPINE BESYLATE 10 MG/1
10 TABLET ORAL
Status: DISCONTINUED | OUTPATIENT
Start: 2017-02-17 | End: 2017-02-17 | Stop reason: HOSPADM

## 2017-02-17 RX ADMIN — INSULIN LISPRO 3 UNITS: 100 INJECTION, SOLUTION INTRAVENOUS; SUBCUTANEOUS at 06:03

## 2017-02-17 RX ADMIN — LEVOTHYROXINE SODIUM 100 MCG: 100 TABLET ORAL at 06:00

## 2017-02-17 RX ADMIN — PREGABALIN 100 MG: 100 CAPSULE ORAL at 09:00

## 2017-02-17 RX ADMIN — METOPROLOL TARTRATE 50 MG: 25 TABLET, FILM COATED ORAL at 08:01

## 2017-02-17 RX ADMIN — VILAZODONE HYDROCHLORIDE 20 MG: 20 TABLET ORAL at 09:00

## 2017-02-17 RX ADMIN — AMLODIPINE BESYLATE 10 MG: 10 TABLET ORAL at 08:00

## 2017-02-17 RX ADMIN — LISINOPRIL 40 MG: 20 TABLET ORAL at 06:05

## 2017-02-17 ASSESSMENT — PAIN SCALES - GENERAL: PAINLEVEL_OUTOF10: 0

## 2017-02-17 NOTE — PROGRESS NOTES
Pt aao x 4. Maeve. Seems fatigued. Family at bedside. poc discussed with pt. Pt still has HA, prn pain med given per mar. /91. Scheduled metoprolol given. Will check BP in an hour. Calls for assistance. Call light within reach. Hourly rounding in use
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
## 2017-02-17 NOTE — DISCHARGE SUMMARY
CHIEF COMPLAINT ON ADMISSION  Dizziness and headache.    HPI & HOSPITAL COURSE  This is a 61 year old female with a history of hypertension, hypothyroidism, hyperlipidemia, migraine, anxiety, and methamphetamine use.  She presented to the Emergency Room with the above complaints and was found to be in hypertensive urgency with a systolic blood pressure in the 200s.  The patient did report that she is noncompliant with her home antihypertensive medications.  She was started on her home antihypertensives with some improvement.  Ultimately, norvasc was added to her regimen to further control her blood pressure.  The patient reported feeling more depressed and anxious recently and feels her current antidepressant is not working.  Psychiatry assessed the patient and recommended outpatient psychiatry for management of her depression and anxiety, as she has been on many different antidepressants without much improvement in her symptoms.  The patient was also noted to have a Hgb A1c of 7.7 with elevated blood sugars.  She reported that has been on metformin in the past and at this time will be restarted on this for better glucose control.  As the patient's symptoms have improved and there remains no further acute needs, she will be discharged home.    DISCHARGE PROBLEM LIST  1.  Hypertensive urgency - resolved.  2.  Diabetes Mellitus type 2  3.  Depression.  4.  Anxiety.  5.  Intractable headache - resolved.  6.  Chronic obstructive pulmonary disease    FOLLOW UP  The patient has an appointment with her PCP on 2/17/2017.  She is recommended to have an outpatient psychiatry consult.    MEDICATIONS ON DISCHARGE   Lesia Freeman   Home Medication Instructions SEDRICK:[id number]    Printed on:02/17/17 0803   Medication Information                      amlodipine (NORVASC) 10 MG Tab  Take 1 Tab by mouth every day.             ATORVASTATIN CALCIUM PO  Take 80 mg by mouth every day.             Butalbital-APAP-Caffeine (FIORICET  PO)  Take  by mouth every 6 hours as needed.             ClonazePAM (KLONOPIN PO)  Take 1 mg by mouth 1 time daily as needed (anxiety).             Cyclobenzaprine HCl (FLEXERIL PO)  Take  by mouth.             Hydrocodone-Acetaminophen (NORCO PO)  Take  by mouth 3 times a day.             ibuprofen (MOTRIN) 800 MG Tab  Take 800 mg by mouth 3 times a day.             levothyroxine (SYNTHROID) 100 MCG Tab  Take 100 mcg by mouth Every morning on an empty stomach. Indications: Underactive Thyroid             LEVOTHYROXINE SODIUM PO  Take  by mouth.             Liothyronine Sodium (CYTOMEL PO)  Take 5 mcg by mouth every day.             LISINOPRIL PO  Take 40 mg by mouth every day.             metformin (GLUCOPHAGE) 500 MG Tab  Take 1 Tab by mouth 2 times a day, with meals.             METOPROLOL TARTRATE PO  Take 25 mg by mouth every day.             pregabalin (LYRICA) 75 MG Cap  Take 100 mg by mouth Once. Indications: Generalized Anxiety Disorder             SUMAtriptan Succinate (IMITREX PO)  Take  by mouth.             TRAZODONE HCL PO  Take 100 mg by mouth every day.             Vilazodone HCl (VIIBRYD PO)  Take 20 mg by mouth every day.                 DIET  Orders Placed This Encounter   Procedures   • Diet Order     Standing Status: Standing      Number of Occurrences: 1      Standing Expiration Date:      Order Specific Question:  Diet:     Answer:  Cardiac [6]     Order Specific Question:  Diet:     Answer:  2 Gram Sodium [7]     Order Specific Question:  Diet:     Answer:  Diabetic [3]       ACTIVITY  As tolerated.    CONSULTATIONS  Psychiatry.    LABORATORY  Lab Results   Component Value Date/Time    SODIUM 137 02/17/2017 01:20 AM    POTASSIUM 4.1 02/17/2017 01:20 AM    CHLORIDE 103 02/17/2017 01:20 AM    CO2 28 02/17/2017 01:20 AM    GLUCOSE 279* 02/17/2017 01:20 AM    BUN 12 02/17/2017 01:20 AM    CREATININE 0.81 02/17/2017 01:20 AM        Lab Results   Component Value Date/Time    WBC 12.1* 02/17/2017  01:20 AM    HEMOGLOBIN 14.9 02/17/2017 01:20 AM    HEMATOCRIT 45.3 02/17/2017 01:20 AM    PLATELET COUNT 209 02/17/2017 01:20 AM        Total time of the discharge process was 34 minutes.

## 2017-02-17 NOTE — PROGRESS NOTES
IV infiltrated and removed. Per pt, pt will discharge early today for doctor's appointment which is scheduled at 0930. Pt wants to see doctor first in the morning before this RN attempts to start new IV.

## 2017-02-17 NOTE — PROGRESS NOTES
Assessment done, Pt educated on plan of care. Patient resting in bed, no signs of distress,  no complains of pain at this time. Call light within reach,  side rails up, will monitor. Condition stable

## 2017-02-17 NOTE — DISCHARGE INSTRUCTIONS
Discharge Instructions    Discharged to home by taxi with escort. Discharged via wheelchair, hospital escort: Yes.  Special equipment needed: Not Applicable    Be sure to schedule a follow-up appointment with your primary care doctor or any specialists as instructed.     Discharge Plan:   Diet Plan: Discussed  Activity Level: Discussed  Smoking Cessation Offered: Patient Refused  Confirmed Follow up Appointment: Appointment Scheduled  Confirmed Symptoms Management: Discussed  Medication Reconciliation Updated: Yes  Influenza Vaccine Indication: Not indicated: Previously immunized this influenza season and > 8 years of age    I understand that a diet low in cholesterol, fat, and sodium is recommended for good health. Unless I have been given specific instructions below for another diet, I accept this instruction as my diet prescription.   Other diet: low fat    Special Instructions: None    · Is patient discharged on Warfarin / Coumadin?   No     · Is patient Post Blood Transfusion?  No    Depression / Suicide Risk    As you are discharged from this [facility] facility, it is important to learn how to keep safe from harming yourself.    Recognize the warning signs:  · Abrupt changes in personality, positive or negative- including increase in energy   · Giving away possessions  · Change in eating patterns- significant weight changes-  positive or negative  · Change in sleeping patterns- unable to sleep or sleeping all the time   · Unwillingness or inability to communicate  · Depression  · Unusual sadness, discouragement and loneliness  · Talk of wanting to die  · Neglect of personal appearance   · Rebelliousness- reckless behavior  · Withdrawal from people/activities they love  · Confusion- inability to concentrate     If you or a loved one observes any of these behaviors or has concerns about self-harm, here's what you can do:  · Talk about it- your feelings and reasons for harming yourself  · Remove any means  that you might use to hurt yourself (examples: pills, rope, extension cords, firearm)  · Get professional help from the community (Mental Health, Substance Abuse, psychological counseling)  · Do not be alone:Call your Safe Contact- someone whom you trust who will be there for you.  · Call your local CRISIS HOTLINE 014-8311 or 476-720-6166  · Call your local Children's Mobile Crisis Response Team [place] (932) 383-7686 or www.[employer]  · Call the toll free National Suicide Prevention Hotlines   · National Suicide Prevention Lifeline 705-281-XYDA (6349)  · National Hope Line Network 800-SUICIDE (061-4029)

## 2017-02-18 LAB
BACTERIA UR CULT: NORMAL
SIGNIFICANT IND 70042: NORMAL
SITE SITE: NORMAL
SOURCE SOURCE: NORMAL

## 2017-04-29 ENCOUNTER — HOSPITAL ENCOUNTER (EMERGENCY)
Facility: MEDICAL CENTER | Age: 62
End: 2017-05-01
Attending: EMERGENCY MEDICINE
Payer: MEDICAID

## 2017-04-29 DIAGNOSIS — T50.902A INTENTIONAL DRUG OVERDOSE, INITIAL ENCOUNTER (HCC): ICD-10-CM

## 2017-04-29 DIAGNOSIS — Z72.89 DELIBERATE SELF-CUTTING: ICD-10-CM

## 2017-04-29 DIAGNOSIS — Z91.410 HISTORY OF DOMESTIC PHYSICAL ABUSE IN ADULT: ICD-10-CM

## 2017-04-29 LAB
ALBUMIN SERPL BCP-MCNC: 3.5 G/DL (ref 3.2–4.9)
ALBUMIN/GLOB SERPL: 1.4 G/DL
ALP SERPL-CCNC: 149 U/L (ref 30–99)
ALT SERPL-CCNC: 64 U/L (ref 2–50)
ANION GAP SERPL CALC-SCNC: 8 MMOL/L (ref 0–11.9)
APAP SERPL-MCNC: <10 UG/ML (ref 10–30)
AST SERPL-CCNC: 23 U/L (ref 12–45)
BILIRUB SERPL-MCNC: 0.3 MG/DL (ref 0.1–1.5)
BUN SERPL-MCNC: 11 MG/DL (ref 8–22)
CALCIUM SERPL-MCNC: 8.7 MG/DL (ref 8.5–10.5)
CHLORIDE SERPL-SCNC: 109 MMOL/L (ref 96–112)
CO2 SERPL-SCNC: 25 MMOL/L (ref 20–33)
CREAT SERPL-MCNC: 0.54 MG/DL (ref 0.5–1.4)
GFR SERPL CREATININE-BSD FRML MDRD: >60 ML/MIN/1.73 M 2
GLOBULIN SER CALC-MCNC: 2.5 G/DL (ref 1.9–3.5)
GLUCOSE SERPL-MCNC: 199 MG/DL (ref 65–99)
POTASSIUM SERPL-SCNC: 3.5 MMOL/L (ref 3.6–5.5)
PROT SERPL-MCNC: 6 G/DL (ref 6–8.2)
SALICYLATES SERPL-MCNC: 0 MG/DL (ref 15–25)
SODIUM SERPL-SCNC: 142 MMOL/L (ref 135–145)

## 2017-04-29 PROCEDURE — 36415 COLL VENOUS BLD VENIPUNCTURE: CPT

## 2017-04-29 PROCEDURE — 80053 COMPREHEN METABOLIC PANEL: CPT

## 2017-04-29 PROCEDURE — 99285 EMERGENCY DEPT VISIT HI MDM: CPT

## 2017-04-29 PROCEDURE — 80307 DRUG TEST PRSMV CHEM ANLYZR: CPT

## 2017-04-29 ASSESSMENT — PAIN SCALES - GENERAL: PAINLEVEL_OUTOF10: 0

## 2017-04-29 ASSESSMENT — LIFESTYLE VARIABLES: DO YOU DRINK ALCOHOL: NO

## 2017-04-29 NOTE — ED AVS SNAPSHOT
5/1/2017    Lesia Freeman  [address] [place] [address]    Dear Lesia:    [facility] wants to ensure your discharge home is safe and you or your loved ones have had all of your questions answered regarding your care after you leave the hospital.    Below is a list of resources and contact information should you have any questions regarding your hospital stay, follow-up instructions, or active medical symptoms.    Questions or Concerns Regarding… Contact   Medical Questions Related to Your Discharge  (7 days a week, 8am-5pm) Contact a Nurse Care Coordinator   [phone]   Medical Questions Not Related to Your Discharge  (24 hours a day / 7 days a week)  Contact the Nurse Health Line   [phone]    Medications or Discharge Instructions Refer to your discharge packet   or contact your [facility] Primary Care Provider   [phone]   Follow-up Appointment(s) Schedule your appointment via [employer]   or contact Scheduling [phone]   Billing Review your statement via [employer]  or contact Billing [phone]   Medical Records Review your records via [employer]   or contact Medical Records [phone]     You may receive a telephone call within two days of discharge. This call is to make certain you understand your discharge instructions and have the opportunity to have any questions answered. You can also easily access your medical information, test results and upcoming appointments via the [employer] free online health management tool. You can learn more and sign up at [employer]. For assistance setting up your [employer] account, please call [phone].    Once again, we want to ensure your discharge home is safe and that you have a clear understanding of any next steps in your care. If you have any questions or concerns, please do not hesitate to contact us, we are here for you. Thank you for choosing [facility] for your healthcare needs.    Sincerely,    Your [facility] Healthcare Team

## 2017-04-29 NOTE — ED AVS SNAPSHOT
Home Care Instructions                                                                                                                Lesia Freeman   MRN: [id number]    Department:  [facility], Emergency Dept   Date of Visit:  4/29/2017            [facility], Emergency Dept    [address]    [place] [address]    Phone:  [phone]      You were seen by     1. Olvin Fay M.D.    2. Riddhi Powers D.O.    3. Salvador Soto M.D.    4. Robert Phillips M.D.      Your Diagnosis Was     Intentional drug overdose, initial encounter ([facility])     T50.902A       These are the medications you received during your hospitalization from 04/29/2017 2225 to 05/01/2017 1056     Date/Time Order Dose Route Action    04/30/2017 1025 lorazepam (ATIVAN) injection 1 mg 1 mg Intramuscular Given    04/30/2017 1025 haloperidol lactate (HALDOL) injection 5 mg 5 mg Intramuscular Given    05/01/2017 0900 amlodipine (NORVASC) tablet 5 mg 5 mg Oral Given    04/30/2017 1729 amlodipine (NORVASC) tablet 5 mg 5 mg Oral Given    05/01/2017 1015 atorvastatin (LIPITOR) tablet 80 mg 80 mg Oral Given    04/30/2017 1630 buPROPion SR (WELLBUTRIN-SR) tablet 150 mg 150 mg Oral Refused    05/01/2017 0900 cyclobenzaprine (FLEXERIL) tablet 10 mg 10 mg Oral Refused    04/30/2017 1728 cyclobenzaprine (FLEXERIL) tablet 10 mg 10 mg Oral Refused    05/01/2017 0900 estradiol (ESTRACE) tablet 1 mg 1 mg Oral Refused    05/01/2017 1030 hydrocodone/acetaminophen (NORCO)  MG per tablet 1 Tab 1 Tab Oral Given    04/30/2017 1729 hydrocodone/acetaminophen (NORCO)  MG per tablet 1 Tab 1 Tab Oral Given    05/01/2017 0730 levothyroxine (SYNTHROID) tablet 100 mcg 100 mcg Oral Given    05/01/2017 1000 liothyronine (CYTOMEL) tablet 5 mcg 5 mcg Oral Given    05/01/2017 0915 metoprolol SR (TOPROL XL) tablet 25 mg 25 mg Oral Given    05/01/2017 1000 pregabalin (LYRICA) capsule 100 mg 100 mg Oral Given    04/30/2017 2100  pregabalin (LYRICA) capsule 100 mg 100 mg Oral Given    04/30/2017 1729 pregabalin (LYRICA) capsule 100 mg 100 mg Oral Given    05/01/2017 0915 vitamin D (Ergocalciferol) (DRISDOL) capsule 50,000 Units 50,000 Units Oral Given      Follow-up Information     1. Follow up with Alberto Blackwood M.D. In 2 days.    Specialty:  Family Medicine    Contact information    [address]   [place] [address] [phone]          2. Follow up with [facility], Emergency Dept.    Specialty:  Emergency Medicine    Why:  As needed    Contact information    [address]  [place] [address] [phone]      Medication Information     Review all of your home medications and newly ordered medications with your primary doctor and/or pharmacist as soon as possible. Follow medication instructions as directed by your doctor and/or pharmacist.     Please keep your complete medication list with you and share with your physician. Update the information when medications are discontinued, doses are changed, or new medications (including over-the-counter products) are added; and carry medication information at all times in the event of emergency situations.               Medication List      ASK your doctor about these medications        Instructions    Morning Afternoon Evening Bedtime    acetaminophen/caffeine/butalbital 325-40-50 mg -40 MG Tabs   Commonly known as:  FIORICET        Take 1 Tab by mouth every 6 hours as needed for Headache.   Dose:  1 Tab                        amlodipine 5 MG Tabs   Last time this was given:  5 mg on 5/1/2017  9:00 AM   Commonly known as:  NORVASC        Take 5 mg by mouth every day.   Dose:  5 mg                        atorvastatin 80 MG tablet   Last time this was given:  80 mg on 5/1/2017 10:15 AM   Commonly known as:  LIPITOR        Take 80 mg by mouth every day.   Dose:  80 mg                        clonazepam 1 MG Tabs   Commonly known as:  KLONOPIN        Take  0.25 mg by mouth 1 time daily as needed.   Dose:  0.25 mg                        cyclobenzaprine 10 MG Tabs   Commonly known as:  FLEXERIL        Take 10 mg by mouth every day.   Dose:  10 mg                        estradiol 1 MG Tabs   Commonly known as:  ESTRACE        Take 1 mg by mouth every day.   Dose:  1 mg                        hydrocodone/acetaminophen  MG Tabs   Last time this was given:  1 Tab on 5/1/2017 10:30 AM   Commonly known as:  NORCO        Take 1 Tab by mouth every 8 hours as needed.   Dose:  1 Tab                        levothyroxine 100 MCG Tabs   Last time this was given:  100 mcg on 5/1/2017  7:30 AM   Commonly known as:  SYNTHROID        Take 100 mcg by mouth Every morning on an empty stomach.   Dose:  100 mcg                        liothyronine 5 MCG Tabs   Last time this was given:  5 mcg on 5/1/2017 10:00 AM   Commonly known as:  CYTOMEL        Take 5 mcg by mouth every day.   Dose:  5 mcg                        metoprolol SR 25 MG Tb24   Last time this was given:  25 mg on 5/1/2017  9:15 AM   Commonly known as:  TOPROL XL        Take 25 mg by mouth every day.   Dose:  25 mg                        NESINA 25 MG Tabs   Generic drug:  Alogliptin Benzoate        Take 1 Tab by mouth every day.   Dose:  1 Tab                        pregabalin 100 MG Caps   Last time this was given:  100 mg on 5/1/2017 10:00 AM   Commonly known as:  LYRICA        Take 100 mg by mouth 3 times a day.   Dose:  100 mg                        sumatriptan 50 MG Tabs   Commonly known as:  IMITREX        Take 50 mg by mouth Once PRN for Migraine.   Dose:  50 mg                        VIIBRYD 20 MG Tabs   Generic drug:  Vilazodone HCl        Take 1 Tab by mouth every day.   Dose:  1 Tab                        vitamin D (Ergocalciferol) 79376 UNITS Caps capsule   Last time this was given:  50,000 Units on 5/1/2017  9:15 AM   Commonly known as:  DRISDOL        Take 50,000 Units by mouth every 7 days.   Dose:  51435  Units                                Procedures and tests performed during your visit     Procedure/Test Number of Times Performed    ACETAMINOPHEN 1    CMP 1    ED CONSULT TO BEHAVIORAL HEALTH 1    ESTIMATED GFR 1    IP CONSULT TO  2    NOTIFY ADMITTING  OF SUICIDE RISK 1    POC BREATHALIZER 1    SALICYLATE 1    URINE DRUG SCREEN 1        Discharge Instructions       Domestic Violence Information  WHAT IS DOMESTIC VIOLENCE?   Domestic violence, also called intimate partner violence, can involve physical, emotional, psychological, sexual, and economic abuse by a current or former intimate partner. Stalking is also considered a type of domestic violence. Domestic violence can happen between people who are or were:   · .  · Dating.  · Living together.  Abusers repeatedly act to maintain control and power over their partner.  Physical abuse can include:  · Slapping.    · Hitting.    · Kicking.    · Punching.  · Choking.  · Pulling the victim's hair.  · Damaging the victim's property.  · Threatening or hurting the victim with weapons.  · Trapping the victim in his or her home.  · Forcing the victim to use drugs or alcohol.  Emotional and psychological abuse can include:   · Threats.  · Insults.    · Isolation.    · Humiliation.  · Jealousy and possessiveness.  · Blame.  · Withholding affection.    · Intimidation.    · Manipulation.    · Limiting contact with friends and family.    Sexual abuse can include:   · Forcing sex.    · Forcing sexual touching.    · Hurting the victim during sex.  · Forcing the victim to have sex with other people.  · Giving the victim a sexually transmitted disease (STD) on purpose.  Economic abuse can include:   · Controlling resources, such as money, food, transportation, a phone, or computer.  · Stealing money from the victim or his or her family or friends.  · Forbidding the victim to work.  · Refusing to work or to contribute to the household.  Stalking can  include:  · Making repeated, unwanted phone calls, emails, or text messages.  · Leaving cards, letters, flowers or other items the victim does not want.  · Watching or following the victim from a distance.  · Going to places where the victim does not want the abuser.  · Entering the victim's home or car.  · Damaging the victim's personal property.  WHAT ARE SOME WARNING SIGNS OF DOMESTIC VIOLENCE?   Physical signs  · Bruises.    · Broken bones.    · Burns or cuts.    · Physical pain.    · Head injury.  Emotional and psychological signs   · Crying.    · Depression.    · Hopelessness.    · Desperation.    · Trouble sleeping.    · Fear of partner.    · Anxiety.  · Suicidal behavior.  · Antisocial behavior.  · Low self-esteem.  · Fear of intimacy.  · Flashbacks.  Sexual signs   · Bruising, swelling, or bleeding of the genital or rectal area.    · Signs of a sexually transmitted infection, such as genital sores, warts, or discharge coming from the genital area.    · Pain in the genital area.    · Unintended pregnancy.  · Problems with pregnancy, including delayed prenatal care and prematurity.  Economic signs   · Having little money or food.    · Homelessness.  · Asking for or borrowing money.  WHAT ARE COMMON BEHAVIORS OF THOSE AFFECTED BY DOMESTIC VIOLENCE?  Those affected by domestic violence may:   · Be late to work or other events.    · Not show up to places as promised.    · Have to let their partner know where they are and who they are with.    · Be isolated or kept from seeing friends or family.    · Make comments about their partner's temper or behavior.    · Make excuses for their partner.    · Engage in high-risk sexual behaviors.  · Use drugs or alcohol.  · Have unhealthy diet-related behaviors.  WHAT ARE COMMON FEELINGS OF THOSE AFFECTED BY DOMESTIC VIOLENCE?   Those affected by domestic violence may feel that:    · They have to be careful not to say or do things that trigger their partner's anger.    · They  cannot do anything right.    · They deserve to be treated badly.    · They are overreacting to their partner's behavior or temper.    · They cannot trust their own feelings.    · They cannot trust other people.    · They are trapped.    · Their partner would take away their children.    · They are emotionally drained or numb.    · Their life is in danger.    · They might have to kill their partner to survive.    WHERE CAN YOU GET HELP?   Domestic violence hotlines and websites  If you do not feel safe searching for help online at home, use a computer at a [employer] to access the Internet. Call 911 if you are in immediate danger or need medical help.   · The National Domestic Violence Hotline.  ¨ The 24-hour phone hotline is 1-446.849.9696 or 1-580.649.8471 (TTY).    ¨ The videophone is available Monday through Friday, 9 a.m. to 5 p.m. Call 1-875.964.1297.  ¨ The website is http://[employer]  · The National Sexual Assault Hotline.  ¨ The 24-hour phone hotline is 1-479.551.6627.  ¨ You can access the online hotline at https://[email or web address].com.org/online/  Shelters for victims of domestic violence    If you are a victim of domestic violence, there are resources to help you find a temporary place for you and your children to live (shelter). The specific address of these shelters is often not known to the public.   Police   Report assaults, threats, and stalking to the police.    Counselors and counseling centers  People who have been victims of domestic violence can benefit from counseling. Counseling can help you cope with difficult emotions and empower you to plan for your future safety. The topics you discuss with a counselor are private and confidential. Children of domestic violence victims also might need counseling to manage stress and anxiety.   The court system  You can work with a  or an advocate to get legal protection against an abuser. Protection includes restraining orders and private addresses.  Crimes against you, such as assault, can also be prosecuted through the courts. Laws vary by state.     This information is not intended to replace advice given to you by your health care provider. Make sure you discuss any questions you have with your health care provider.     Document Released: 03/09/2005 Document Revised: 01/08/2016 Document Reviewed: 09/04/2015  [employer] Interactive Patient Education ©2016 [employer]      Self-Destructive Behavior  Self-destructive behavior includes attitudes and behaviors that can harm, injure, or be destructive to the person who has or performs them. Self-destructive behavior is often used as a coping strategy to deal with painful emotions and stress. It is often habit-forming and difficult to control without help. Self-destructive behavior can result in serious injury or death.   EXAMPLES OF SELF-DESTRUCTIVE BEHAVIOR   · Hurting yourself (self-injury). This includes cutting, scratching, burning, or otherwise injuring yourself to release tension or lessen emotional pain.  · Binge eating and other eating disorders.  · Excessive drinking.  · Drug abuse.  · Shopping sprees, reckless spending, or gambling that causes you to go into debt.  · Any type of addictive behavior. This includes gambling, shoplifting, and other behaviors.  · Not setting healthy limits. This may include depriving yourself of proper rest and nutrition in order to meet the demands of others.  · Intense or chronic feelings of anxiety, anger, impulsiveness, unworthiness, hopelessness, or loss.  WHAT CAUSES SELF-DESTRUCTIVE BEHAVIOR?  The underlying causes of self-destructive behavior are personal and may include:  · Difficulty managing stress.  · Trauma or abuse (including in past life events).  · Other mental health issues, such as clinical depression and low self-esteem.  WHAT SHOULD I DO IF I WANT TO HURT MYSELF?   · See your health care provider or counselor. He or she will help you recognize the source  of your problems, sort out your feelings, and get the help you need.  · Avoid alcohol and drugs.  · Try distracting yourself with other activities (such as chewing gum, exercising, cleaning, or snapping rubber bands) until you are calm and can seek help.  · Learn how to deal with stress in healthy, positive ways (such as with relaxation techniques or exercise).  · Learn to identify and avoid the things that trigger your self-destructive behavior.  · Get involved in a local support group.  SEEK MEDICAL CARE IF:   · You are experiencing suicidal thoughts.  · You experience illness or injury as a result of self-destructive behavior.  SEEK IMMEDIATE MEDICAL CARE IF:   Your behavior is out of control and your life is in danger. Call:  · The police.    · Your health care provider.  · Local emergency services (911 in [place]  MAKE SURE YOU:  · Understand these instructions.  · Will watch your condition.  · Will get help right away if you are not doing well or get worse.     This information is not intended to replace advice given to you by your health care provider. Make sure you discuss any questions you have with your health care provider.     Document Released: 01/25/2006 Document Revised: 08/20/2014 Document Reviewed: 05/06/2014  Elsevier Interactive Patient Education ©2016 [employer] Inc[employer]            Patient Information     Patient Information    Following emergency treatment: all patient requiring follow-up care must return either to a private physician or a clinic if your condition worsens before you are able to obtain further medical attention, please return to the emergency room.     Billing Information    At [facility], we work to make the billing process streamlined for our patients.  Our Representatives are here to answer any questions you may have regarding your hospital bill.  If you have insurance coverage and have supplied your insurance information to us, we will submit a claim to your insurer on your  behalf.  Should you have any questions regarding your bill, we can be reached online or by phone as follows:  Online: You are able pay your bills online or live chat with our representatives about any billing questions you may have. We are here to help Monday - Friday from 8:00am to 7:30pm and 9:00am - 12:00pm on Saturdays.  Please visit [email or web address]  for more information.   Phone:  [phone] or [phone]    Please note that your emergency physician, surgeon, pathologist, radiologist, anesthesiologist, and other specialists are not employed by [facility] and will therefore bill separately for their services.  Please contact them directly for any questions concerning their bills at the numbers below:     Emergency Physician Services:  [phone]  [facility]:  [phone]  [facility]:  [phone]  [facility]:  [phone]    1. Your final bill may vary from the amount quoted upon discharge if all procedures are not complete at that time, or if your doctor has additional procedures of which we are not aware. You will receive an additional bill if you return to the Emergency Department at [facility] for suture removal regardless of the facility of which the sutures were placed.     2. Please arrange for settlement of this account at the emergency registration.    3. All self-pay accounts are due in full at the time of treatment.  If you are unable to meet this obligation then payment is expected within 4-5 days.     4. If you have had radiology studies (CT, X-ray, Ultrasound, MRI), you have received a preliminary result during your emergency department visit. Please contact the radiology department [phone] to receive a copy of your final result. Please discuss the Final result with your primary physician or with the follow up physician provided.     Crisis Hotline:  National Crisis Hotline:  4-755-AJMYBEK or  1-873.243.3908  [place] Crisis Hotline:    1-366.850.8139 or 142-684-5713         ED Discharge Follow Up Questions    1. In order to provide you with very good care, we would like to follow up with a phone call in the next few days.  May we have your permission to contact you?     YES /  NO    2. What is the best phone number to call you? (       )_____-__________    3. What is the best time to call you?      Morning  /  Afternoon  /  Evening                   Patient Signature:  ____________________________________________________________    Date:  ____________________________________________________________

## 2017-04-30 LAB
AMPHET UR QL SCN: NEGATIVE
BARBITURATES UR QL SCN: POSITIVE
BENZODIAZ UR QL SCN: POSITIVE
BZE UR QL SCN: NEGATIVE
CANNABINOIDS UR QL SCN: NEGATIVE
MDMA UR QL SCN: NEGATIVE
METHADONE UR QL SCN: NEGATIVE
OPIATES UR QL SCN: POSITIVE
OXYCODONE UR QL SCN: POSITIVE
PCP UR QL SCN: NEGATIVE
POC BREATHALIZER: 0 PERCENT (ref 0–0.01)
PROPOXYPH UR QL SCN: NEGATIVE

## 2017-04-30 PROCEDURE — 90791 PSYCH DIAGNOSTIC EVALUATION: CPT

## 2017-04-30 PROCEDURE — 80307 DRUG TEST PRSMV CHEM ANLYZR: CPT

## 2017-04-30 PROCEDURE — 700102 HCHG RX REV CODE 250 W/ 637 OVERRIDE(OP): Performed by: EMERGENCY MEDICINE

## 2017-04-30 PROCEDURE — 96372 THER/PROPH/DIAG INJ SC/IM: CPT

## 2017-04-30 PROCEDURE — A9270 NON-COVERED ITEM OR SERVICE: HCPCS | Performed by: EMERGENCY MEDICINE

## 2017-04-30 PROCEDURE — 302970 POC BREATHALIZER: Performed by: EMERGENCY MEDICINE

## 2017-04-30 PROCEDURE — 700111 HCHG RX REV CODE 636 W/ 250 OVERRIDE (IP): Performed by: EMERGENCY MEDICINE

## 2017-04-30 RX ORDER — METOPROLOL SUCCINATE 25 MG/1
25 TABLET, EXTENDED RELEASE ORAL DAILY
Status: DISCONTINUED | OUTPATIENT
Start: 2017-04-30 | End: 2017-05-01 | Stop reason: HOSPADM

## 2017-04-30 RX ORDER — CYCLOBENZAPRINE HCL 10 MG
10 TABLET ORAL DAILY
COMMUNITY

## 2017-04-30 RX ORDER — LORAZEPAM 2 MG/ML
1 INJECTION INTRAMUSCULAR ONCE
Status: COMPLETED | OUTPATIENT
Start: 2017-04-30 | End: 2017-04-30

## 2017-04-30 RX ORDER — VILAZODONE HYDROCHLORIDE 20 MG/1
1 TABLET ORAL DAILY
COMMUNITY

## 2017-04-30 RX ORDER — HYDROCODONE BITARTRATE AND ACETAMINOPHEN 10; 325 MG/1; MG/1
1 TABLET ORAL EVERY 8 HOURS PRN
COMMUNITY

## 2017-04-30 RX ORDER — ALOGLIPTIN 25 MG/1
1 TABLET, FILM COATED ORAL DAILY
COMMUNITY

## 2017-04-30 RX ORDER — PREGABALIN 100 MG/1
100 CAPSULE ORAL 3 TIMES DAILY
COMMUNITY

## 2017-04-30 RX ORDER — AMLODIPINE BESYLATE 5 MG/1
5 TABLET ORAL DAILY
COMMUNITY

## 2017-04-30 RX ORDER — ERGOCALCIFEROL 1.25 MG/1
50000 CAPSULE ORAL
Status: DISCONTINUED | OUTPATIENT
Start: 2017-05-01 | End: 2017-05-01 | Stop reason: HOSPADM

## 2017-04-30 RX ORDER — CLONAZEPAM 0.5 MG/1
0.25 TABLET ORAL
Status: DISCONTINUED | OUTPATIENT
Start: 2017-04-30 | End: 2017-04-30

## 2017-04-30 RX ORDER — BUPROPION HYDROCHLORIDE 150 MG/1
150 TABLET, EXTENDED RELEASE ORAL 2 TIMES DAILY
COMMUNITY
End: 2017-05-01

## 2017-04-30 RX ORDER — ESTRADIOL 1 MG/1
1 TABLET ORAL DAILY
COMMUNITY

## 2017-04-30 RX ORDER — LIOTHYRONINE SODIUM 5 UG/1
5 TABLET ORAL DAILY
COMMUNITY

## 2017-04-30 RX ORDER — ERGOCALCIFEROL 1.25 MG/1
50000 CAPSULE ORAL
COMMUNITY

## 2017-04-30 RX ORDER — ATORVASTATIN CALCIUM 20 MG/1
80 TABLET, FILM COATED ORAL DAILY
Status: DISCONTINUED | OUTPATIENT
Start: 2017-04-30 | End: 2017-05-01 | Stop reason: HOSPADM

## 2017-04-30 RX ORDER — ATORVASTATIN CALCIUM 80 MG/1
80 TABLET, FILM COATED ORAL DAILY
COMMUNITY

## 2017-04-30 RX ORDER — CYCLOBENZAPRINE HCL 10 MG
10 TABLET ORAL DAILY
Status: DISCONTINUED | OUTPATIENT
Start: 2017-04-30 | End: 2017-05-01 | Stop reason: HOSPADM

## 2017-04-30 RX ORDER — CLONAZEPAM 1 MG/1
0.25 TABLET ORAL
COMMUNITY

## 2017-04-30 RX ORDER — HALOPERIDOL 5 MG/ML
5 INJECTION INTRAMUSCULAR ONCE
Status: COMPLETED | OUTPATIENT
Start: 2017-04-30 | End: 2017-04-30

## 2017-04-30 RX ORDER — PREGABALIN 100 MG/1
100 CAPSULE ORAL 3 TIMES DAILY
Status: DISCONTINUED | OUTPATIENT
Start: 2017-04-30 | End: 2017-05-01 | Stop reason: HOSPADM

## 2017-04-30 RX ORDER — ESTRADIOL 1 MG/1
1 TABLET ORAL DAILY
Status: DISCONTINUED | OUTPATIENT
Start: 2017-04-30 | End: 2017-05-01 | Stop reason: HOSPADM

## 2017-04-30 RX ORDER — SUMATRIPTAN 50 MG/1
50 TABLET, FILM COATED ORAL
COMMUNITY

## 2017-04-30 RX ORDER — METOPROLOL SUCCINATE 25 MG/1
25 TABLET, EXTENDED RELEASE ORAL DAILY
COMMUNITY

## 2017-04-30 RX ORDER — LIOTHYRONINE SODIUM 5 UG/1
5 TABLET ORAL DAILY
Status: DISCONTINUED | OUTPATIENT
Start: 2017-04-30 | End: 2017-05-01 | Stop reason: HOSPADM

## 2017-04-30 RX ORDER — HYDROCODONE BITARTRATE AND ACETAMINOPHEN 10; 325 MG/1; MG/1
1 TABLET ORAL EVERY 8 HOURS PRN
Status: DISCONTINUED | OUTPATIENT
Start: 2017-04-30 | End: 2017-05-01 | Stop reason: HOSPADM

## 2017-04-30 RX ORDER — BUTALBITAL, ACETAMINOPHEN AND CAFFEINE 50; 325; 40 MG/1; MG/1; MG/1
1 TABLET ORAL EVERY 6 HOURS PRN
Status: DISCONTINUED | OUTPATIENT
Start: 2017-04-30 | End: 2017-05-01 | Stop reason: HOSPADM

## 2017-04-30 RX ORDER — AMLODIPINE BESYLATE 5 MG/1
5 TABLET ORAL DAILY
Status: DISCONTINUED | OUTPATIENT
Start: 2017-04-30 | End: 2017-05-01 | Stop reason: HOSPADM

## 2017-04-30 RX ORDER — LEVOTHYROXINE SODIUM 0.1 MG/1
100 TABLET ORAL
Status: DISCONTINUED | OUTPATIENT
Start: 2017-04-30 | End: 2017-05-01 | Stop reason: HOSPADM

## 2017-04-30 RX ORDER — SUMATRIPTAN 50 MG/1
50 TABLET, FILM COATED ORAL
Status: DISCONTINUED | OUTPATIENT
Start: 2017-04-30 | End: 2017-05-01 | Stop reason: HOSPADM

## 2017-04-30 RX ORDER — BUPROPION HYDROCHLORIDE 150 MG/1
150 TABLET, EXTENDED RELEASE ORAL 2 TIMES DAILY
Status: DISCONTINUED | OUTPATIENT
Start: 2017-04-30 | End: 2017-04-30 | Stop reason: CLARIF

## 2017-04-30 RX ORDER — BUTALBITAL, ACETAMINOPHEN AND CAFFEINE 50; 325; 40 MG/1; MG/1; MG/1
1 TABLET ORAL EVERY 6 HOURS PRN
COMMUNITY

## 2017-04-30 RX ORDER — LEVOTHYROXINE SODIUM 0.1 MG/1
100 TABLET ORAL
COMMUNITY

## 2017-04-30 RX ADMIN — AMLODIPINE BESYLATE 5 MG: 5 TABLET ORAL at 17:29

## 2017-04-30 RX ADMIN — HALOPERIDOL LACTATE 5 MG: 5 INJECTION, SOLUTION INTRAMUSCULAR at 10:25

## 2017-04-30 RX ADMIN — PREGABALIN 100 MG: 100 CAPSULE ORAL at 21:00

## 2017-04-30 RX ADMIN — LORAZEPAM 1 MG: 2 INJECTION INTRAMUSCULAR; INTRAVENOUS at 10:25

## 2017-04-30 RX ADMIN — HYDROCODONE BITARTRATE AND ACETAMINOPHEN 1 TABLET: 10; 325 TABLET ORAL at 17:29

## 2017-04-30 RX ADMIN — PREGABALIN 100 MG: 100 CAPSULE ORAL at 17:29

## 2017-04-30 ASSESSMENT — PAIN SCALES - GENERAL: PAINLEVEL_OUTOF10: 2

## 2017-04-30 NOTE — CONSULTS
Lesia was agitated in beltran, had to be returned to room physically and med. By injection.  She remains on legal 2000. Awaiting acceptance [place] for evaluation and treatment.

## 2017-04-30 NOTE — ED NOTES
Received patient from red pod for observation. Pt alert and oriented. Respirations easy and unlabored. Denies needs at this time.

## 2017-04-30 NOTE — ED NOTES
Pt became highly agitated when discussed poc. Pt tearful denies SI reports she was taking medications to go to sleep and so her  would leave her alone. Will discuss with life skills and .

## 2017-04-30 NOTE — ED NOTES
Assumed pt care, pt ambulating without difficulty. Pt can be visualized in room. No sitter available at this time.

## 2017-04-30 NOTE — ED NOTES
Patient's home medications have been reviewed by the pharmacy team.     Past Medical History   Diagnosis Date   • Psychiatric disorder      depression and anxiety   • Hypertension    • Hypothyroidism        Patient's Medications   New Prescriptions    No medications on file   Previous Medications    ACETAMINOPHEN/CAFFEINE/BUTALBITAL 325-40-50 MG (FIORICET) -40 MG TAB    Take 1 Tab by mouth every 6 hours as needed for Headache.    ALOGLIPTIN BENZOATE (NESINA) 25 MG TAB    Take 1 Tab by mouth every day.    AMLODIPINE (NORVASC) 5 MG TAB    Take 5 mg by mouth every day.    ATORVASTATIN (LIPITOR) 80 MG TABLET    Take 80 mg by mouth every day.    BUPROPION SR (WELLBUTRIN-SR) 150 MG TABLET SR 12 HR SUSTAINED-RELEASE TABLET    Take 150 mg by mouth 2 times a day.    CLONAZEPAM (KLONOPIN) 1 MG TAB    Take 0.25 mg by mouth 1 time daily as needed.    CYCLOBENZAPRINE (FLEXERIL) 10 MG TAB    Take 10 mg by mouth every day.    ESTRADIOL (ESTRACE) 1 MG TAB    Take 1 mg by mouth every day.    HYDROCODONE/ACETAMINOPHEN (NORCO)  MG TAB    Take 1 Tab by mouth every 8 hours as needed.    LEVOTHYROXINE (SYNTHROID) 100 MCG TAB    Take 100 mcg by mouth Every morning on an empty stomach.    LIOTHYRONINE (CYTOMEL) 5 MCG TAB    Take 5 mcg by mouth every day.    METOPROLOL SR (TOPROL XL) 25 MG TABLET SR 24 HR    Take 25 mg by mouth every day.    PREGABALIN (LYRICA) 100 MG CAP    Take 100 mg by mouth 3 times a day.    SUMATRIPTAN (IMITREX) 50 MG TAB    Take 50 mg by mouth Once PRN for Migraine.    VILAZODONE HCL (VIIBRYD) 20 MG TAB    Take 1 Tab by mouth every day.    VITAMIN D, ERGOCALCIFEROL, (DRISDOL) 71328 UNITS CAP CAPSULE    Take 50,000 Units by mouth every 7 days.   Modified Medications    No medications on file   Discontinued Medications    AMLODIPINE (NORVASC) 10 MG TAB    Take 1 Tab by mouth every day.    ATORVASTATIN CALCIUM PO    Take 80 mg by mouth every day.    BUTALBITAL-APAP-CAFFEINE (FIORICET PO)    Take  by mouth  every 6 hours as needed.    CLONAZEPAM (KLONOPIN PO)    Take 1 mg by mouth 1 time daily as needed (anxiety).    CYCLOBENZAPRINE HCL (FLEXERIL PO)    Take  by mouth.    HYDROCODONE-ACETAMINOPHEN (NORCO PO)    Take  by mouth 3 times a day.    IBUPROFEN (MOTRIN) 800 MG TAB    Take 800 mg by mouth 3 times a day.    LEVOTHYROXINE (SYNTHROID) 100 MCG TAB    Take 100 mcg by mouth Every morning on an empty stomach. Indications: Underactive Thyroid    LEVOTHYROXINE SODIUM PO    Take  by mouth.    LIOTHYRONINE SODIUM (CYTOMEL PO)    Take 5 mcg by mouth every day.    LISINOPRIL PO    Take 40 mg by mouth every day.    METFORMIN (GLUCOPHAGE) 500 MG TAB    Take 1 Tab by mouth 2 times a day, with meals.    METOPROLOL TARTRATE PO    Take 25 mg by mouth every day.    PREGABALIN (LYRICA) 75 MG CAP    Take 100 mg by mouth Once. Indications: Generalized Anxiety Disorder    SUMATRIPTAN SUCCINATE (IMITREX PO)    Take  by mouth.    TRAZODONE HCL PO    Take 100 mg by mouth every day.    VILAZODONE HCL (VIIBRYD PO)    Take 20 mg by mouth every day.          A:  Resumed all medications except clonazepam, vilazodone, and alogliptin.        P:    Home medications have been reordered as appropriate.      Mabel MehtaD., BCPS

## 2017-04-30 NOTE — ED NOTES
Blood drawn and sent to lab. Breathalyzer 0.00. Pt reports taking pills at approx 2145. Denies vomiting. VSS. ERP at BS.
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
## 2017-04-30 NOTE — ED NOTES
Med rec complete per medication bottles in Narc room in safekeeping  Allergies reviewed    Patient stated she was weaning off her Clonazepam and was down to 1/4 tablet twice weekly but now she has taken more lately due to stress at home

## 2017-04-30 NOTE — ED NOTES
One bag of pt. Belongings labeled and placed into ambulance bay lockers. One bag labeled containing all of pt's medications taken to Pharmacy for safe keeping.

## 2017-04-30 NOTE — DISCHARGE INSTRUCTIONS
Domestic Violence Information  WHAT IS DOMESTIC VIOLENCE?   Domestic violence, also called intimate partner violence, can involve physical, emotional, psychological, sexual, and economic abuse by a current or former intimate partner. Stalking is also considered a type of domestic violence. Domestic violence can happen between people who are or were:   · .  · Dating.  · Living together.  Abusers repeatedly act to maintain control and power over their partner.  Physical abuse can include:  · Slapping.    · Hitting.    · Kicking.    · Punching.  · Choking.  · Pulling the victim's hair.  · Damaging the victim's property.  · Threatening or hurting the victim with weapons.  · Trapping the victim in his or her home.  · Forcing the victim to use drugs or alcohol.  Emotional and psychological abuse can include:   · Threats.  · Insults.    · Isolation.    · Humiliation.  · Jealousy and possessiveness.  · Blame.  · Withholding affection.    · Intimidation.    · Manipulation.    · Limiting contact with friends and family.    Sexual abuse can include:   · Forcing sex.    · Forcing sexual touching.    · Hurting the victim during sex.  · Forcing the victim to have sex with other people.  · Giving the victim a sexually transmitted disease (STD) on purpose.  Economic abuse can include:   · Controlling resources, such as money, food, transportation, a phone, or computer.  · Stealing money from the victim or his or her family or friends.  · Forbidding the victim to work.  · Refusing to work or to contribute to the household.  Stalking can include:  · Making repeated, unwanted phone calls, emails, or text messages.  · Leaving cards, letters, flowers or other items the victim does not want.  · Watching or following the victim from a distance.  · Going to places where the victim does not want the abuser.  · Entering the victim's home or car.  · Damaging the victim's personal property.  WHAT ARE SOME WARNING SIGNS OF DOMESTIC  VIOLENCE?   Physical signs  · Bruises.    · Broken bones.    · Burns or cuts.    · Physical pain.    · Head injury.  Emotional and psychological signs   · Crying.    · Depression.    · Hopelessness.    · Desperation.    · Trouble sleeping.    · Fear of partner.    · Anxiety.  · Suicidal behavior.  · Antisocial behavior.  · Low self-esteem.  · Fear of intimacy.  · Flashbacks.  Sexual signs   · Bruising, swelling, or bleeding of the genital or rectal area.    · Signs of a sexually transmitted infection, such as genital sores, warts, or discharge coming from the genital area.    · Pain in the genital area.    · Unintended pregnancy.  · Problems with pregnancy, including delayed prenatal care and prematurity.  Economic signs   · Having little money or food.    · Homelessness.  · Asking for or borrowing money.  WHAT ARE COMMON BEHAVIORS OF THOSE AFFECTED BY DOMESTIC VIOLENCE?  Those affected by domestic violence may:   · Be late to work or other events.    · Not show up to places as promised.    · Have to let their partner know where they are and who they are with.    · Be isolated or kept from seeing friends or family.    · Make comments about their partner's temper or behavior.    · Make excuses for their partner.    · Engage in high-risk sexual behaviors.  · Use drugs or alcohol.  · Have unhealthy diet-related behaviors.  WHAT ARE COMMON FEELINGS OF THOSE AFFECTED BY DOMESTIC VIOLENCE?   Those affected by domestic violence may feel that:    · They have to be careful not to say or do things that trigger their partner's anger.    · They cannot do anything right.    · They deserve to be treated badly.    · They are overreacting to their partner's behavior or temper.    · They cannot trust their own feelings.    · They cannot trust other people.    · They are trapped.    · Their partner would take away their children.    · They are emotionally drained or numb.    · Their life is in danger.    · They might have to kill  their partner to survive.    WHERE CAN YOU GET HELP?   Domestic violence hotlines and websites  If you do not feel safe searching for help online at home, use a computer at a public YellowSchedule to access the Internet. Call 921 if you are in immediate danger or need medical help.   · The National Domestic Violence Hotline.  ¨ The 24-hour phone hotline is 1-148.774.3016 or 1-572.214.4446 (TTY).    ¨ The videophone is available Monday through Friday, 9 a.m. to 5 p.m. Call 1-313.265.9600.  ¨ The website is http://[employer]  · The National Sexual Assault Hotline.  ¨ The 24-hour phone hotline is 1-549.619.1392.  ¨ You can access the online hotline at https://ohl.Profusa.org/online/  Shelters for victims of domestic violence    If you are a victim of domestic violence, there are resources to help you find a temporary place for you and your children to live (shelter). The specific address of these shelters is often not known to the public.   Police   Report assaults, threats, and stalking to the police.    Counselors and counseling centers  People who have been victims of domestic violence can benefit from counseling. Counseling can help you cope with difficult emotions and empower you to plan for your future safety. The topics you discuss with a counselor are private and confidential. Children of domestic violence victims also might need counseling to manage stress and anxiety.   The court system  You can work with a  or an advocate to get legal protection against an abuser. Protection includes restraining orders and private addresses. Crimes against you, such as assault, can also be prosecuted through the courts. Laws vary by state.     This information is not intended to replace advice given to you by your health care provider. Make sure you discuss any questions you have with your health care provider.     Document Released: 03/09/2005 Document Revised: 01/08/2016 Document Reviewed: 09/04/2015  [employer]  Patient Education ©2016 [employer]      Self-Destructive Behavior  Self-destructive behavior includes attitudes and behaviors that can harm, injure, or be destructive to the person who has or performs them. Self-destructive behavior is often used as a coping strategy to deal with painful emotions and stress. It is often habit-forming and difficult to control without help. Self-destructive behavior can result in serious injury or death.   EXAMPLES OF SELF-DESTRUCTIVE BEHAVIOR   · Hurting yourself (self-injury). This includes cutting, scratching, burning, or otherwise injuring yourself to release tension or lessen emotional pain.  · Binge eating and other eating disorders.  · Excessive drinking.  · Drug abuse.  · Shopping sprees, reckless spending, or gambling that causes you to go into debt.  · Any type of addictive behavior. This includes gambling, shoplifting, and other behaviors.  · Not setting healthy limits. This may include depriving yourself of proper rest and nutrition in order to meet the demands of others.  · Intense or chronic feelings of anxiety, anger, impulsiveness, unworthiness, hopelessness, or loss.  WHAT CAUSES SELF-DESTRUCTIVE BEHAVIOR?  The underlying causes of self-destructive behavior are personal and may include:  · Difficulty managing stress.  · Trauma or abuse (including in past life events).  · Other mental health issues, such as clinical depression and low self-esteem.  WHAT SHOULD I DO IF I WANT TO HURT MYSELF?   · See your health care provider or counselor. He or she will help you recognize the source of your problems, sort out your feelings, and get the help you need.  · Avoid alcohol and drugs.  · Try distracting yourself with other activities (such as chewing gum, exercising, cleaning, or snapping rubber bands) until you are calm and can seek help.  · Learn how to deal with stress in healthy, positive ways (such as with relaxation techniques or exercise).  · Learn to identify and  avoid the things that trigger your self-destructive behavior.  · Get involved in a local support group.  SEEK MEDICAL CARE IF:   · You are experiencing suicidal thoughts.  · You experience illness or injury as a result of self-destructive behavior.  SEEK IMMEDIATE MEDICAL CARE IF:   Your behavior is out of control and your life is in danger. Call:  · The police.    · Your health care provider.  · Local emergency services (911 in [place]  MAKE SURE YOU:  · Understand these instructions.  · Will watch your condition.  · Will get help right away if you are not doing well or get worse.     This information is not intended to replace advice given to you by your health care provider. Make sure you discuss any questions you have with your health care provider.     Document Released: 01/25/2006 Document Revised: 08/20/2014 Document Reviewed: 05/06/2014  Elsevier Interactive Patient Education ©2016 [employer] Inc.
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
## 2017-05-01 VITALS
WEIGHT: 150 LBS | BODY MASS INDEX: 30.24 KG/M2 | RESPIRATION RATE: 16 BRPM | SYSTOLIC BLOOD PRESSURE: 161 MMHG | DIASTOLIC BLOOD PRESSURE: 90 MMHG | TEMPERATURE: 99 F | OXYGEN SATURATION: 100 % | HEIGHT: 59 IN | HEART RATE: 91 BPM

## 2017-05-01 PROCEDURE — A9270 NON-COVERED ITEM OR SERVICE: HCPCS | Performed by: EMERGENCY MEDICINE

## 2017-05-01 PROCEDURE — 700102 HCHG RX REV CODE 250 W/ 637 OVERRIDE(OP): Performed by: EMERGENCY MEDICINE

## 2017-05-01 RX ADMIN — ATORVASTATIN CALCIUM 80 MG: 20 TABLET, FILM COATED ORAL at 10:15

## 2017-05-01 RX ADMIN — LIOTHYRONINE SODIUM 5 MCG: 5 TABLET ORAL at 10:00

## 2017-05-01 RX ADMIN — AMLODIPINE BESYLATE 5 MG: 5 TABLET ORAL at 09:00

## 2017-05-01 RX ADMIN — LEVOTHYROXINE SODIUM 100 MCG: 100 TABLET ORAL at 07:30

## 2017-05-01 RX ADMIN — PREGABALIN 100 MG: 100 CAPSULE ORAL at 10:00

## 2017-05-01 RX ADMIN — METOPROLOL SUCCINATE 25 MG: 25 TABLET, EXTENDED RELEASE ORAL at 09:15

## 2017-05-01 RX ADMIN — ERGOCALCIFEROL 50000 UNITS: 1.25 CAPSULE ORAL at 09:15

## 2017-05-01 RX ADMIN — HYDROCODONE BITARTRATE AND ACETAMINOPHEN 1 TABLET: 10; 325 TABLET ORAL at 10:30

## 2017-05-01 ASSESSMENT — PAIN SCALES - GENERAL: PAINLEVEL_OUTOF10: 0

## 2017-05-01 NOTE — ED NOTES
Patient in no apparent distress, sitting upright on gurney.  Pt reports no suicidal ideation at this time.
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
## 2017-05-01 NOTE — PROGRESS NOTES
Pt resting eyes closed, awakens to verbal stimuli, bed in lowest position, resp even and non labored

## 2017-05-01 NOTE — ED NOTES
All lines and monitors disconnected.  Discharge instructions reviewed, questions answered.  Pt to karen, escorted by RN.  Pt states all belongings in possession.
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
## 2017-05-01 NOTE — ED NOTES
Pt awake sitting up in bed. Pt agitated asking when she can leave. Reinforced poc. Pt easily agitated. Refusing many of her home meds. Pt observer at door.

## 2017-05-01 NOTE — ED NOTES
Report received from Lindsey STONE, assumed care of patient.  White board updated, POC discussed. Bed in lowest position.